# Patient Record
Sex: MALE | Race: BLACK OR AFRICAN AMERICAN | NOT HISPANIC OR LATINO
[De-identification: names, ages, dates, MRNs, and addresses within clinical notes are randomized per-mention and may not be internally consistent; named-entity substitution may affect disease eponyms.]

---

## 2017-05-18 ENCOUNTER — APPOINTMENT (OUTPATIENT)
Dept: ORTHOPEDIC SURGERY | Facility: CLINIC | Age: 72
End: 2017-05-18

## 2017-11-21 ENCOUNTER — APPOINTMENT (OUTPATIENT)
Dept: OTOLARYNGOLOGY | Facility: CLINIC | Age: 72
End: 2017-11-21
Payer: MEDICARE

## 2017-11-21 VITALS
BODY MASS INDEX: 32.47 KG/M2 | DIASTOLIC BLOOD PRESSURE: 71 MMHG | SYSTOLIC BLOOD PRESSURE: 146 MMHG | WEIGHT: 202 LBS | HEART RATE: 77 BPM | HEIGHT: 66 IN

## 2017-11-21 DIAGNOSIS — H90.3 SENSORINEURAL HEARING LOSS, BILATERAL: ICD-10-CM

## 2017-11-21 DIAGNOSIS — H69.83 OTHER SPECIFIED DISORDERS OF EUSTACHIAN TUBE, BILATERAL: ICD-10-CM

## 2017-11-21 DIAGNOSIS — Z83.3 FAMILY HISTORY OF DIABETES MELLITUS: ICD-10-CM

## 2017-11-21 DIAGNOSIS — J34.2 DEVIATED NASAL SEPTUM: ICD-10-CM

## 2017-11-21 DIAGNOSIS — H93.13 TINNITUS, BILATERAL: ICD-10-CM

## 2017-11-21 DIAGNOSIS — Z80.9 FAMILY HISTORY OF MALIGNANT NEOPLASM, UNSPECIFIED: ICD-10-CM

## 2017-11-21 DIAGNOSIS — J34.89 OTHER SPECIFIED DISORDERS OF NOSE AND NASAL SINUSES: ICD-10-CM

## 2017-11-21 DIAGNOSIS — J34.3 HYPERTROPHY OF NASAL TURBINATES: ICD-10-CM

## 2017-11-21 PROCEDURE — 92557 COMPREHENSIVE HEARING TEST: CPT

## 2017-11-21 PROCEDURE — 92550 TYMPANOMETRY & REFLEX THRESH: CPT

## 2017-11-21 PROCEDURE — 31231 NASAL ENDOSCOPY DX: CPT

## 2017-11-21 PROCEDURE — 99204 OFFICE O/P NEW MOD 45 MIN: CPT | Mod: 25

## 2018-05-22 ENCOUNTER — APPOINTMENT (OUTPATIENT)
Dept: OTOLARYNGOLOGY | Facility: CLINIC | Age: 73
End: 2018-05-22

## 2019-11-14 ENCOUNTER — INPATIENT (INPATIENT)
Facility: HOSPITAL | Age: 74
LOS: 3 days | Discharge: HOME | DRG: 872 | End: 2019-11-18
Attending: SPECIALIST | Admitting: SPECIALIST
Payer: MEDICARE

## 2019-11-14 VITALS
DIASTOLIC BLOOD PRESSURE: 60 MMHG | HEIGHT: 65.5 IN | SYSTOLIC BLOOD PRESSURE: 128 MMHG | WEIGHT: 212.08 LBS | RESPIRATION RATE: 18 BRPM | HEART RATE: 130 BPM | OXYGEN SATURATION: 97 % | TEMPERATURE: 101 F

## 2019-11-14 DIAGNOSIS — Z98.890 OTHER SPECIFIED POSTPROCEDURAL STATES: Chronic | ICD-10-CM

## 2019-11-14 DIAGNOSIS — A41.9 SEPSIS, UNSPECIFIED ORGANISM: ICD-10-CM

## 2019-11-14 DIAGNOSIS — N17.9 ACUTE KIDNEY FAILURE, UNSPECIFIED: ICD-10-CM

## 2019-11-14 DIAGNOSIS — E11.9 TYPE 2 DIABETES MELLITUS WITHOUT COMPLICATIONS: ICD-10-CM

## 2019-11-14 DIAGNOSIS — I10 ESSENTIAL (PRIMARY) HYPERTENSION: ICD-10-CM

## 2019-11-14 DIAGNOSIS — N39.0 URINARY TRACT INFECTION, SITE NOT SPECIFIED: ICD-10-CM

## 2019-11-14 DIAGNOSIS — Z29.9 ENCOUNTER FOR PROPHYLACTIC MEASURES, UNSPECIFIED: ICD-10-CM

## 2019-11-14 LAB
ALBUMIN SERPL ELPH-MCNC: 4.2 G/DL — SIGNIFICANT CHANGE UP (ref 3.3–5)
ALP SERPL-CCNC: 58 U/L — SIGNIFICANT CHANGE UP (ref 40–120)
ALT FLD-CCNC: 19 U/L — SIGNIFICANT CHANGE UP (ref 10–45)
ANION GAP SERPL CALC-SCNC: 13 MMOL/L — SIGNIFICANT CHANGE UP (ref 5–17)
ANISOCYTOSIS BLD QL: SLIGHT — SIGNIFICANT CHANGE UP
APPEARANCE UR: CLEAR — SIGNIFICANT CHANGE UP
APTT BLD: 34.3 SEC — SIGNIFICANT CHANGE UP (ref 27.5–36.3)
AST SERPL-CCNC: 17 U/L — SIGNIFICANT CHANGE UP (ref 10–40)
BACTERIA # UR AUTO: ABNORMAL
BASE EXCESS BLDV CALC-SCNC: -2.8 MMOL/L — LOW (ref -2–2)
BASOPHILS # BLD AUTO: 0 K/UL — SIGNIFICANT CHANGE UP (ref 0–0.2)
BASOPHILS NFR BLD AUTO: 0 % — SIGNIFICANT CHANGE UP (ref 0–2)
BILIRUB SERPL-MCNC: 1.3 MG/DL — HIGH (ref 0.2–1.2)
BILIRUB UR-MCNC: ABNORMAL
BUN SERPL-MCNC: 16 MG/DL — SIGNIFICANT CHANGE UP (ref 7–23)
CA-I SERPL-SCNC: 1.19 MMOL/L — SIGNIFICANT CHANGE UP (ref 1.12–1.3)
CALCIUM SERPL-MCNC: 9.4 MG/DL — SIGNIFICANT CHANGE UP (ref 8.4–10.5)
CHLORIDE BLDV-SCNC: 104 MMOL/L — SIGNIFICANT CHANGE UP (ref 96–108)
CHLORIDE SERPL-SCNC: 103 MMOL/L — SIGNIFICANT CHANGE UP (ref 96–108)
CO2 BLDV-SCNC: 22 MMOL/L — SIGNIFICANT CHANGE UP (ref 22–30)
CO2 SERPL-SCNC: 18 MMOL/L — LOW (ref 22–31)
COLOR SPEC: ABNORMAL
CREAT SERPL-MCNC: 1.7 MG/DL — HIGH (ref 0.5–1.3)
DIFF PNL FLD: NEGATIVE — SIGNIFICANT CHANGE UP
EOSINOPHIL # BLD AUTO: 0 K/UL — SIGNIFICANT CHANGE UP (ref 0–0.5)
EOSINOPHIL NFR BLD AUTO: 0 % — SIGNIFICANT CHANGE UP (ref 0–6)
EPI CELLS # UR: 1 /HPF — SIGNIFICANT CHANGE UP
GAS PNL BLDV: 136 MMOL/L — SIGNIFICANT CHANGE UP (ref 135–145)
GAS PNL BLDV: SIGNIFICANT CHANGE UP
GAS PNL BLDV: SIGNIFICANT CHANGE UP
GLUCOSE BLDV-MCNC: 324 MG/DL — HIGH (ref 70–99)
GLUCOSE SERPL-MCNC: 349 MG/DL — HIGH (ref 70–99)
GLUCOSE UR QL: NEGATIVE — SIGNIFICANT CHANGE UP
HCO3 BLDV-SCNC: 21 MMOL/L — SIGNIFICANT CHANGE UP (ref 21–29)
HCT VFR BLD CALC: 25.8 % — LOW (ref 39–50)
HCT VFR BLDA CALC: 30 % — LOW (ref 39–50)
HGB BLD CALC-MCNC: 9.6 G/DL — LOW (ref 13–17)
HGB BLD-MCNC: 9.2 G/DL — LOW (ref 13–17)
HYALINE CASTS # UR AUTO: 1 /LPF — SIGNIFICANT CHANGE UP (ref 0–2)
INR BLD: 1.29 RATIO — HIGH (ref 0.88–1.16)
KETONES UR-MCNC: NEGATIVE — SIGNIFICANT CHANGE UP
LACTATE BLDV-MCNC: 1.8 MMOL/L — SIGNIFICANT CHANGE UP (ref 0.7–2)
LEUKOCYTE ESTERASE UR-ACNC: ABNORMAL
LYMPHOCYTES # BLD AUTO: 0.39 K/UL — LOW (ref 1–3.3)
LYMPHOCYTES # BLD AUTO: 5 % — LOW (ref 13–44)
MANUAL SMEAR VERIFICATION: SIGNIFICANT CHANGE UP
MCHC RBC-ENTMCNC: 25.1 PG — LOW (ref 27–34)
MCHC RBC-ENTMCNC: 35.7 GM/DL — SIGNIFICANT CHANGE UP (ref 32–36)
MCV RBC AUTO: 70.3 FL — LOW (ref 80–100)
MICROCYTES BLD QL: SIGNIFICANT CHANGE UP
MONOCYTES # BLD AUTO: 0.47 K/UL — SIGNIFICANT CHANGE UP (ref 0–0.9)
MONOCYTES NFR BLD AUTO: 6 % — SIGNIFICANT CHANGE UP (ref 2–14)
NEUTROPHILS # BLD AUTO: 6.94 K/UL — SIGNIFICANT CHANGE UP (ref 1.8–7.4)
NEUTROPHILS NFR BLD AUTO: 89 % — HIGH (ref 43–77)
NITRITE UR-MCNC: POSITIVE
NRBC # BLD: 0 /100 — SIGNIFICANT CHANGE UP (ref 0–0)
PCO2 BLDV: 34 MMHG — LOW (ref 35–50)
PH BLDV: 7.41 — SIGNIFICANT CHANGE UP (ref 7.35–7.45)
PH UR: 6 — SIGNIFICANT CHANGE UP (ref 5–8)
PLAT MORPH BLD: NORMAL — SIGNIFICANT CHANGE UP
PLATELET # BLD AUTO: 142 K/UL — LOW (ref 150–400)
PO2 BLDV: 37 MMHG — SIGNIFICANT CHANGE UP (ref 25–45)
POTASSIUM BLDV-SCNC: 3.5 MMOL/L — SIGNIFICANT CHANGE UP (ref 3.5–5.3)
POTASSIUM SERPL-MCNC: 3.6 MMOL/L — SIGNIFICANT CHANGE UP (ref 3.5–5.3)
POTASSIUM SERPL-SCNC: 3.6 MMOL/L — SIGNIFICANT CHANGE UP (ref 3.5–5.3)
PROT SERPL-MCNC: 7.5 G/DL — SIGNIFICANT CHANGE UP (ref 6–8.3)
PROT UR-MCNC: ABNORMAL
PROTHROM AB SERPL-ACNC: 14.9 SEC — HIGH (ref 10–12.9)
RAPID RVP RESULT: SIGNIFICANT CHANGE UP
RBC # BLD: 3.67 M/UL — LOW (ref 4.2–5.8)
RBC # FLD: 18.6 % — HIGH (ref 10.3–14.5)
RBC BLD AUTO: ABNORMAL
RBC CASTS # UR COMP ASSIST: 2 /HPF — SIGNIFICANT CHANGE UP (ref 0–4)
SAO2 % BLDV: 68 % — SIGNIFICANT CHANGE UP (ref 67–88)
SODIUM SERPL-SCNC: 134 MMOL/L — LOW (ref 135–145)
SP GR SPEC: 1.01 — SIGNIFICANT CHANGE UP (ref 1.01–1.02)
UROBILINOGEN FLD QL: ABNORMAL
WBC # BLD: 7.8 K/UL — SIGNIFICANT CHANGE UP (ref 3.8–10.5)
WBC # FLD AUTO: 7.8 K/UL — SIGNIFICANT CHANGE UP (ref 3.8–10.5)
WBC UR QL: 14 /HPF — HIGH (ref 0–5)

## 2019-11-14 PROCEDURE — 71045 X-RAY EXAM CHEST 1 VIEW: CPT | Mod: 26

## 2019-11-14 PROCEDURE — 99223 1ST HOSP IP/OBS HIGH 75: CPT

## 2019-11-14 PROCEDURE — 99284 EMERGENCY DEPT VISIT MOD MDM: CPT

## 2019-11-14 RX ORDER — SODIUM CHLORIDE 9 MG/ML
1000 INJECTION INTRAMUSCULAR; INTRAVENOUS; SUBCUTANEOUS
Refills: 0 | Status: DISCONTINUED | OUTPATIENT
Start: 2019-11-14 | End: 2019-11-18

## 2019-11-14 RX ORDER — AMLODIPINE BESYLATE 2.5 MG/1
10 TABLET ORAL DAILY
Refills: 0 | Status: DISCONTINUED | OUTPATIENT
Start: 2019-11-14 | End: 2019-11-18

## 2019-11-14 RX ORDER — CEFEPIME 1 G/1
2000 INJECTION, POWDER, FOR SOLUTION INTRAMUSCULAR; INTRAVENOUS EVERY 12 HOURS
Refills: 0 | Status: DISCONTINUED | OUTPATIENT
Start: 2019-11-15 | End: 2019-11-18

## 2019-11-14 RX ORDER — DEXTROSE 50 % IN WATER 50 %
15 SYRINGE (ML) INTRAVENOUS ONCE
Refills: 0 | Status: DISCONTINUED | OUTPATIENT
Start: 2019-11-14 | End: 2019-11-18

## 2019-11-14 RX ORDER — PHENAZOPYRIDINE HCL 100 MG
200 TABLET ORAL ONCE
Refills: 0 | Status: COMPLETED | OUTPATIENT
Start: 2019-11-14 | End: 2019-11-14

## 2019-11-14 RX ORDER — CEFEPIME 1 G/1
2000 INJECTION, POWDER, FOR SOLUTION INTRAMUSCULAR; INTRAVENOUS ONCE
Refills: 0 | Status: COMPLETED | OUTPATIENT
Start: 2019-11-14 | End: 2019-11-15

## 2019-11-14 RX ORDER — DEXTROSE 50 % IN WATER 50 %
25 SYRINGE (ML) INTRAVENOUS ONCE
Refills: 0 | Status: DISCONTINUED | OUTPATIENT
Start: 2019-11-14 | End: 2019-11-18

## 2019-11-14 RX ORDER — CEFEPIME 1 G/1
INJECTION, POWDER, FOR SOLUTION INTRAMUSCULAR; INTRAVENOUS
Refills: 0 | Status: DISCONTINUED | OUTPATIENT
Start: 2019-11-15 | End: 2019-11-18

## 2019-11-14 RX ORDER — ACETAMINOPHEN 500 MG
650 TABLET ORAL ONCE
Refills: 0 | Status: COMPLETED | OUTPATIENT
Start: 2019-11-14 | End: 2019-11-14

## 2019-11-14 RX ORDER — CEFTRIAXONE 500 MG/1
1000 INJECTION, POWDER, FOR SOLUTION INTRAMUSCULAR; INTRAVENOUS ONCE
Refills: 0 | Status: COMPLETED | OUTPATIENT
Start: 2019-11-14 | End: 2019-11-14

## 2019-11-14 RX ORDER — SODIUM CHLORIDE 9 MG/ML
1000 INJECTION INTRAMUSCULAR; INTRAVENOUS; SUBCUTANEOUS ONCE
Refills: 0 | Status: COMPLETED | OUTPATIENT
Start: 2019-11-14 | End: 2019-11-14

## 2019-11-14 RX ORDER — GLUCAGON INJECTION, SOLUTION 0.5 MG/.1ML
1 INJECTION, SOLUTION SUBCUTANEOUS ONCE
Refills: 0 | Status: DISCONTINUED | OUTPATIENT
Start: 2019-11-14 | End: 2019-11-18

## 2019-11-14 RX ORDER — DEXTROSE 50 % IN WATER 50 %
12.5 SYRINGE (ML) INTRAVENOUS ONCE
Refills: 0 | Status: DISCONTINUED | OUTPATIENT
Start: 2019-11-14 | End: 2019-11-18

## 2019-11-14 RX ORDER — SODIUM CHLORIDE 9 MG/ML
1000 INJECTION, SOLUTION INTRAVENOUS
Refills: 0 | Status: DISCONTINUED | OUTPATIENT
Start: 2019-11-14 | End: 2019-11-18

## 2019-11-14 RX ORDER — HEPARIN SODIUM 5000 [USP'U]/ML
5000 INJECTION INTRAVENOUS; SUBCUTANEOUS EVERY 12 HOURS
Refills: 0 | Status: DISCONTINUED | OUTPATIENT
Start: 2019-11-14 | End: 2019-11-18

## 2019-11-14 RX ORDER — ASPIRIN/CALCIUM CARB/MAGNESIUM 324 MG
81 TABLET ORAL DAILY
Refills: 0 | Status: DISCONTINUED | OUTPATIENT
Start: 2019-11-14 | End: 2019-11-18

## 2019-11-14 RX ORDER — INSULIN LISPRO 100/ML
VIAL (ML) SUBCUTANEOUS AT BEDTIME
Refills: 0 | Status: DISCONTINUED | OUTPATIENT
Start: 2019-11-14 | End: 2019-11-18

## 2019-11-14 RX ORDER — INSULIN LISPRO 100/ML
VIAL (ML) SUBCUTANEOUS
Refills: 0 | Status: DISCONTINUED | OUTPATIENT
Start: 2019-11-14 | End: 2019-11-18

## 2019-11-14 RX ADMIN — Medication 650 MILLIGRAM(S): at 17:07

## 2019-11-14 RX ADMIN — Medication 650 MILLIGRAM(S): at 23:28

## 2019-11-14 RX ADMIN — Medication 650 MILLIGRAM(S): at 17:40

## 2019-11-14 RX ADMIN — Medication 650 MILLIGRAM(S): at 21:26

## 2019-11-14 RX ADMIN — CEFTRIAXONE 100 MILLIGRAM(S): 500 INJECTION, POWDER, FOR SOLUTION INTRAMUSCULAR; INTRAVENOUS at 20:54

## 2019-11-14 RX ADMIN — Medication 200 MILLIGRAM(S): at 17:07

## 2019-11-14 RX ADMIN — SODIUM CHLORIDE 1000 MILLILITER(S): 9 INJECTION INTRAMUSCULAR; INTRAVENOUS; SUBCUTANEOUS at 17:12

## 2019-11-14 NOTE — H&P ADULT - NSICDXPASTMEDICALHX_GEN_ALL_CORE_FT
PAST MEDICAL HISTORY:  Arthritis     BPH (benign prostatic hyperplasia)     Diabetes type 2    Hypertension     Sickle cell trait

## 2019-11-14 NOTE — ED PROVIDER NOTE - CLINICAL SUMMARY MEDICAL DECISION MAKING FREE TEXT BOX
We will call you within the next 2-3 days with the results of your urine culture  Drink plenty of fluids to help flush the infection out of your system  Monitor yourself for fevers, chills, back pain, if you develop any of this, go to the ER    Patient Education     Understanding Urinary Tract Infections (UTIs)  Most UTIs are caused by bacteria, although they may also be caused by viruses or fungi. Bacteria from the bowel are the most common source of infection. The infection may start because of any of the following:  · Sexual activity. During sex, bacteria can travel from the penis, vagina, or rectum into the urethra.   · Bacteria on the skin outside the rectum may travel into the urethra. This is more common in women since the rectum and urethra are closer to each other than in men. Wiping from front to back after using the toilet and keeping the area clean can help prevent germs from getting to the urethra.  · Blockage of urine flow through the urinary tract. If urine sits too long, germs may start to grow out of control.      Parts of the urinary tract  The infection can occur in any part of the urinary tract.  · The kidneys collect and store urine.  · The ureters carry urine from the kidneys to the bladder.  · The bladder holds urine until you are ready to let it out.  · The urethra carries urine from the bladder out of the body. It is shorter in women, so bacteria can move through it more easily. The urethra is longer in men, so a UTI is less likely to reach the bladder or kidneys in men.  Date Last Reviewed: 1/1/2017 © 2000-2018 [employer]. [address] [place] [address], [place] [address]. All rights reserved. This information is not intended as a substitute for professional medical care. Always follow your healthcare professional's instructions.            FCO Moncada MD: Pt is a 73 y/o male PMHX DM2, HTN, Sickle cell trait who p/w c/o fever (tmax 102.8) cough, nasal congestion, urinary discomfort x several days. Patient is 3 days s/p cystoscopy by his urologist for eval of painless hematuria. Next day, pt had urinary incontinence, followed by dysuria, urinary urgency and frequency. Pt also developed a cough with white sputum from last week, has 2 home sick contacts with URI sx. No flank pain. Some SP and penile discomfort. No gross hematuria. Ddx includes, however, is not limited to: UTI, PNA, viral syndrome, other. Pt arrived as code sepsis, rec'd empiric abx tx, IVF, basic labs, cx, CXR. Awaiting results, likely d/w PCP and admit

## 2019-11-14 NOTE — H&P ADULT - PROBLEM SELECTOR PLAN 5
hold quinapril in setting of FABIOLA and sepsis hold quinapril in setting of FABIOLA   c/w amlodipine for now

## 2019-11-14 NOTE — H&P ADULT - NSHPPHYSICALEXAM_GEN_ALL_CORE
PHYSICAL EXAM:  Vital Signs Last 24 Hrs  T(C): 37.8 (11-14-19 @ 20:55)  T(F): 100 (11-14-19 @ 20:55), Max: 102.6 (11-14-19 @ 15:29)  HR: 84 (11-14-19 @ 20:55) (84 - 130)  BP: 128/58 (11-14-19 @ 20:55)  BP(mean): --  RR: 18 (11-14-19 @ 20:55) (13 - 21)  SpO2: 97% (11-14-19 @ 20:55) (97% - 100%)  Wt(kg): --    Constitutional: NAD, awake and alert  EYES: EOMI  ENT:  Normal Hearing, no tonsillar exudates   Neck: Soft and supple, No JVD  Lungs: Breath sounds are clear bilaterally, No wheezing, rales or rhonchi  Heart: S1 and S2, regular rate and rhythm, no Murmurs, gallops or rubs  Abdomen: Bowel Sounds present, soft, nontender, nondistended, no guarding, no rebound  Extremities: No cyanosis or clubbing; warm to touch  Vascular: 2+ peripheral pulses lower ex  Neurological: A/O x 3, no focal deficits  Musculoskeletal: 5/5 strength b/l upper and lower extremities  Skin: No rashes  Psych: no depression or anhedonia  HEME: no bruises, no nose bleeds

## 2019-11-14 NOTE — H&P ADULT - PROBLEM SELECTOR PLAN 1
Patient with UTI, recent hx of instrumentation  will broaden Abx coverage to cover pseudomonas  start IV cefepime  f/u urine culture  f/u blood culture  c/w IVF

## 2019-11-14 NOTE — H&P ADULT - PROBLEM SELECTOR PLAN 3
Unknown baseline creatinine  ?prerenal vs. post renal   c/w IVF for now  monitor BMP  hold nephrotoxic agents

## 2019-11-14 NOTE — H&P ADULT - HISTORY OF PRESENT ILLNESS
73 yo male with PMH of T2DM, HTN, BPH, sickle cell train, presents here with fever and urinary frequency.  Patient states on November 6th, he had cystoscopy done.  He received abx x 2 doses.  After that, for first few days he had mild hematuria which cleared.  On Tuesday, patient started having increased urinary frequency.  He says Tuesday night he had to use the bathroom every hour and he was only dribbling and felt like he was not emptying his bladder.  He also had abdominal discomfort and penile discomfort.  Denies any hematuria.  Yesterday he called his urologist, but could not reach him.  Today his symptoms persisted and he also developed a fever of 102 at home.  Therefore he decided to come to ED.  He also has been feeling lethargic and flu like symptoms since Monday after his grandson who was also sick visited him.  Denies any cough, runny nose, chills, diarrhea. 73 yo male with PMH of T2DM, HTN, BPH, sickle cell train, presents here with fever and urinary frequency.  Patient states on November 6th, he had cystoscopy done.  He received abx x 2 doses.  After that, for first few days he had mild hematuria which cleared.  On Tuesday, patient started having increased urinary frequency.  He says Tuesday night he had to use the bathroom every hour and he was only dribbling and felt like he was not emptying his bladder.  He also had abdominal discomfort and penile discomfort.  Denies any hematuria.  Yesterday he called his urologist, but could not reach him.  Today his symptoms persisted and he also developed a fever of 102 at home.  Therefore he decided to come to ED.  He also has been feeling lethargic and flu like symptoms since Monday after his grandson who was also sick visited him.  Denies any cough, runny nose, chills, diarrhea.     On arrival to ED, patient's vitals show Temp 102.6, , /60, saturation 97% on RA.

## 2019-11-14 NOTE — H&P ADULT - NSHPREVIEWOFSYSTEMS_GEN_ALL_CORE
CONSTITUTIONAL: No weakness, fevers or chills  EYES/ENT: No visual changes;  No dysphagia  NECK: No pain or stiffness  RESPIRATORY: No cough, wheezing, hemoptysis; No shortness of breath  CARDIOVASCULAR: No chest pain or palpitations; No lower extremity edema  EXTREMITIES: no le edema, cyanosis, clubbing  MUSCULOSKELETAL: no joint pain, swelling  GASTROINTESTINAL: No abdominal or epigastric pain. No nausea, vomiting, or hematemesis; No diarrhea or constipation. No melena or hematochezia.  BACK: No back pain  GENITOURINARY: +urinary urgency, frequency;  NEUROLOGICAL: No numbness or weakness  SKIN: No itching, burning, rashes, or lesions   PSYCH: no agitation  All other review of systems is negative unless indicated above.

## 2019-11-14 NOTE — ED PROVIDER NOTE - PROGRESS NOTE DETAILS
FCO Moncada MD Reassessment Note: Repeat vital signs noted.  Patient evaluated after fluid administration. Lungs clear, heart RRR, skin exam with capillary refill < 2 seconds and lower extremities warm with no edema and dorsalis pedis pulses 2+ bilaterally.  Patient responding well to resuscitation.

## 2019-11-14 NOTE — ED PROVIDER NOTE - OBJECTIVE STATEMENT
74 year old male PMHX DM2, HTN, Sickle cell trait complaining of productive fever (tmax 102.8) cough, nasal congestion, urinary dribbling that has been progressively getting worst. Patient states he had cystoscopy by his urologist due to painless hematuria. Patient states concerned as he has urinary frequency but never completely empties his bladder. Admits to someone at home with the flu.  Denies dizziness, nausea, vomiting, shortness of breath, chest pain

## 2019-11-14 NOTE — ED ADULT NURSE REASSESSMENT NOTE - NS ED NURSE REASSESS COMMENT FT1
Pt remains stable in the ED. Resting comfortably in bed with family at the bedside. VSS. NAD. Will continue to monitor. Awaiting dispo.

## 2019-11-14 NOTE — H&P ADULT - NSHPLABSRESULTS_GEN_ALL_CORE
Labs personally reviewed:                          9.2    7.80  )-----------( 142      ( 2019 15:58 )             25.8     11-14    134<L>  |  103  |  16  ----------------------------<  349<H>  3.6   |  18<L>  |  1.70<H>    Ca    9.4      2019 15:58    TPro  7.5  /  Alb  4.2  /  TBili  1.3<H>  /  DBili  x   /  AST  17  /  ALT  19  /  AlkPhos  58  11-14        LIVER FUNCTIONS - ( 2019 15:58 )  Alb: 4.2 g/dL / Pro: 7.5 g/dL / ALK PHOS: 58 U/L / ALT: 19 U/L / AST: 17 U/L / GGT: x           PT/INR - ( 2019 15:58 )   PT: 14.9 sec;   INR: 1.29 ratio         PTT - ( 2019 15:58 )  PTT:34.3 sec  Urinalysis Basic - ( 2019 19:52 )    Color: Dark Orange / Appearance: Clear / S.015 / pH: x  Gluc: x / Ketone: Negative  / Bili: Small / Urobili: 2 mg/dL   Blood: x / Protein: 30 mg/dL / Nitrite: Positive   Leuk Esterase: Moderate / RBC: 2 /hpf / WBC 14 /HPF   Sq Epi: x / Non Sq Epi: 1 /hpf / Bacteria: Moderate      CAPILLARY BLOOD GLUCOSE          Imaging:  CXR personally reviewed: no focal opacity    EKG ordered

## 2019-11-14 NOTE — H&P ADULT - NSICDXFAMILYHX_GEN_ALL_CORE_FT
FAMILY HISTORY:  Sibling  Still living? Unknown  Family history of CVA, Age at diagnosis: Age Unknown

## 2019-11-14 NOTE — H&P ADULT - ATTENDING COMMENTS
Patient assigned to me by night hospitalist in charge for management and care for patient for this evening only. Care to be resumed by day hospitalist (Dr. Laboy) in the morning and thereafter.     Patient's care rendered on 11/14/19 at 10PM.      I was physically present for the key portions of the evaluation and management (E/M) service provided.  I agree with the above history, physical, and plan which I have reviewed and edited where appropriate.     Plan discussed with Patient, RN.

## 2019-11-14 NOTE — H&P ADULT - ASSESSMENT
75 yo male with PMH of T2DM, HTN, BPH, sickle cell train, presents here with fever and urinary frequency.

## 2019-11-15 LAB
ALBUMIN SERPL ELPH-MCNC: 3.4 G/DL — SIGNIFICANT CHANGE UP (ref 3.3–5)
ALP SERPL-CCNC: 48 U/L — SIGNIFICANT CHANGE UP (ref 40–120)
ALT FLD-CCNC: 20 U/L — SIGNIFICANT CHANGE UP (ref 10–45)
ANION GAP SERPL CALC-SCNC: 13 MMOL/L — SIGNIFICANT CHANGE UP (ref 5–17)
AST SERPL-CCNC: 30 U/L — SIGNIFICANT CHANGE UP (ref 10–40)
BASOPHILS # BLD AUTO: 0.01 K/UL — SIGNIFICANT CHANGE UP (ref 0–0.2)
BASOPHILS NFR BLD AUTO: 0.2 % — SIGNIFICANT CHANGE UP (ref 0–2)
BILIRUB SERPL-MCNC: 0.7 MG/DL — SIGNIFICANT CHANGE UP (ref 0.2–1.2)
BLD GP AB SCN SERPL QL: NEGATIVE — SIGNIFICANT CHANGE UP
BUN SERPL-MCNC: 16 MG/DL — SIGNIFICANT CHANGE UP (ref 7–23)
CALCIUM SERPL-MCNC: 8.6 MG/DL — SIGNIFICANT CHANGE UP (ref 8.4–10.5)
CHLORIDE SERPL-SCNC: 110 MMOL/L — HIGH (ref 96–108)
CO2 SERPL-SCNC: 19 MMOL/L — LOW (ref 22–31)
CREAT SERPL-MCNC: 1.46 MG/DL — HIGH (ref 0.5–1.3)
CULTURE RESULTS: NO GROWTH — SIGNIFICANT CHANGE UP
EOSINOPHIL # BLD AUTO: 0.03 K/UL — SIGNIFICANT CHANGE UP (ref 0–0.5)
EOSINOPHIL NFR BLD AUTO: 0.6 % — SIGNIFICANT CHANGE UP (ref 0–6)
GLUCOSE BLDC GLUCOMTR-MCNC: 176 MG/DL — HIGH (ref 70–99)
GLUCOSE BLDC GLUCOMTR-MCNC: 198 MG/DL — HIGH (ref 70–99)
GLUCOSE BLDC GLUCOMTR-MCNC: 204 MG/DL — HIGH (ref 70–99)
GLUCOSE BLDC GLUCOMTR-MCNC: 254 MG/DL — HIGH (ref 70–99)
GLUCOSE SERPL-MCNC: 175 MG/DL — HIGH (ref 70–99)
HBA1C BLD-MCNC: 4.8 % — SIGNIFICANT CHANGE UP (ref 4–5.6)
HCT VFR BLD CALC: 20 % — CRITICAL LOW (ref 39–50)
HCT VFR BLD CALC: 20.8 % — CRITICAL LOW (ref 39–50)
HCT VFR BLD CALC: 22.2 % — LOW (ref 39–50)
HCV AB S/CO SERPL IA: 0.08 S/CO — SIGNIFICANT CHANGE UP (ref 0–0.99)
HCV AB SERPL-IMP: SIGNIFICANT CHANGE UP
HGB BLD-MCNC: 7 G/DL — CRITICAL LOW (ref 13–17)
HGB BLD-MCNC: 7.6 G/DL — LOW (ref 13–17)
HGB BLD-MCNC: 8.1 G/DL — LOW (ref 13–17)
IGA FLD-MCNC: 271 MG/DL — SIGNIFICANT CHANGE UP (ref 84–499)
IGG FLD-MCNC: 716 MG/DL — SIGNIFICANT CHANGE UP (ref 610–1660)
IGM SERPL-MCNC: 41 MG/DL — SIGNIFICANT CHANGE UP (ref 35–242)
IMM GRANULOCYTES NFR BLD AUTO: 0.4 % — SIGNIFICANT CHANGE UP (ref 0–1.5)
INTERPRETATION SERPL IFE-IMP: SIGNIFICANT CHANGE UP
KAPPA LC SER QL IFE: 1.8 MG/DL — SIGNIFICANT CHANGE UP (ref 0.33–1.94)
KAPPA LC SER QL IFE: 1.8 MG/DL — SIGNIFICANT CHANGE UP (ref 0.33–1.94)
KAPPA/LAMBDA FREE LIGHT CHAIN RATIO, SERUM: 0.71 RATIO — SIGNIFICANT CHANGE UP (ref 0.26–1.65)
KAPPA/LAMBDA FREE LIGHT CHAIN RATIO, SERUM: 0.71 RATIO — SIGNIFICANT CHANGE UP (ref 0.26–1.65)
LAMBDA LC SER QL IFE: 2.53 MG/DL — SIGNIFICANT CHANGE UP (ref 0.57–2.63)
LAMBDA LC SER QL IFE: 2.53 MG/DL — SIGNIFICANT CHANGE UP (ref 0.57–2.63)
LYMPHOCYTES # BLD AUTO: 0.94 K/UL — LOW (ref 1–3.3)
LYMPHOCYTES # BLD AUTO: 18.8 % — SIGNIFICANT CHANGE UP (ref 13–44)
MAGNESIUM SERPL-MCNC: 2 MG/DL — SIGNIFICANT CHANGE UP (ref 1.6–2.6)
MCHC RBC-ENTMCNC: 24.9 PG — LOW (ref 27–34)
MCHC RBC-ENTMCNC: 25.9 PG — LOW (ref 27–34)
MCHC RBC-ENTMCNC: 25.9 PG — LOW (ref 27–34)
MCHC RBC-ENTMCNC: 35 GM/DL — SIGNIFICANT CHANGE UP (ref 32–36)
MCHC RBC-ENTMCNC: 36.5 GM/DL — HIGH (ref 32–36)
MCHC RBC-ENTMCNC: 36.5 GM/DL — HIGH (ref 32–36)
MCV RBC AUTO: 70.9 FL — LOW (ref 80–100)
MCV RBC AUTO: 71 FL — LOW (ref 80–100)
MCV RBC AUTO: 71.2 FL — LOW (ref 80–100)
MONOCYTES # BLD AUTO: 0.33 K/UL — SIGNIFICANT CHANGE UP (ref 0–0.9)
MONOCYTES NFR BLD AUTO: 6.6 % — SIGNIFICANT CHANGE UP (ref 2–14)
NEUTROPHILS # BLD AUTO: 3.68 K/UL — SIGNIFICANT CHANGE UP (ref 1.8–7.4)
NEUTROPHILS NFR BLD AUTO: 73.4 % — SIGNIFICANT CHANGE UP (ref 43–77)
NRBC # BLD: 0 /100 WBCS — SIGNIFICANT CHANGE UP (ref 0–0)
NRBC # BLD: 0 /100 WBCS — SIGNIFICANT CHANGE UP (ref 0–0)
PHOSPHATE SERPL-MCNC: 2.1 MG/DL — LOW (ref 2.5–4.5)
PLATELET # BLD AUTO: 100 K/UL — LOW (ref 150–400)
PLATELET # BLD AUTO: 107 K/UL — LOW (ref 150–400)
PLATELET # BLD AUTO: 114 K/UL — LOW (ref 150–400)
POTASSIUM SERPL-MCNC: 4.2 MMOL/L — SIGNIFICANT CHANGE UP (ref 3.5–5.3)
POTASSIUM SERPL-SCNC: 4.2 MMOL/L — SIGNIFICANT CHANGE UP (ref 3.5–5.3)
PROT SERPL-MCNC: 6.4 G/DL — SIGNIFICANT CHANGE UP (ref 6–8.3)
RBC # BLD: 2.81 M/UL — LOW (ref 4.2–5.8)
RBC # BLD: 2.93 M/UL — LOW (ref 4.2–5.8)
RBC # BLD: 3.13 M/UL — LOW (ref 4.2–5.8)
RBC # FLD: 18.7 % — HIGH (ref 10.3–14.5)
RBC # FLD: 19 % — HIGH (ref 10.3–14.5)
RBC # FLD: 19.7 % — HIGH (ref 10.3–14.5)
RH IG SCN BLD-IMP: POSITIVE — SIGNIFICANT CHANGE UP
SODIUM SERPL-SCNC: 142 MMOL/L — SIGNIFICANT CHANGE UP (ref 135–145)
SPECIMEN SOURCE: SIGNIFICANT CHANGE UP
WBC # BLD: 4.86 K/UL — SIGNIFICANT CHANGE UP (ref 3.8–10.5)
WBC # BLD: 5.01 K/UL — SIGNIFICANT CHANGE UP (ref 3.8–10.5)
WBC # BLD: 5.33 K/UL — SIGNIFICANT CHANGE UP (ref 3.8–10.5)
WBC # FLD AUTO: 4.86 K/UL — SIGNIFICANT CHANGE UP (ref 3.8–10.5)
WBC # FLD AUTO: 5.01 K/UL — SIGNIFICANT CHANGE UP (ref 3.8–10.5)
WBC # FLD AUTO: 5.33 K/UL — SIGNIFICANT CHANGE UP (ref 3.8–10.5)

## 2019-11-15 PROCEDURE — 99222 1ST HOSP IP/OBS MODERATE 55: CPT

## 2019-11-15 RX ORDER — ACETAMINOPHEN 500 MG
650 TABLET ORAL ONCE
Refills: 0 | Status: COMPLETED | OUTPATIENT
Start: 2019-11-15 | End: 2019-11-15

## 2019-11-15 RX ORDER — CHLORHEXIDINE GLUCONATE 213 G/1000ML
1 SOLUTION TOPICAL DAILY
Refills: 0 | Status: DISCONTINUED | OUTPATIENT
Start: 2019-11-15 | End: 2019-11-18

## 2019-11-15 RX ORDER — INFLUENZA VIRUS VACCINE 15; 15; 15; 15 UG/.5ML; UG/.5ML; UG/.5ML; UG/.5ML
0.5 SUSPENSION INTRAMUSCULAR ONCE
Refills: 0 | Status: COMPLETED | OUTPATIENT
Start: 2019-11-15 | End: 2019-11-15

## 2019-11-15 RX ADMIN — CEFEPIME 100 MILLIGRAM(S): 1 INJECTION, POWDER, FOR SOLUTION INTRAMUSCULAR; INTRAVENOUS at 00:55

## 2019-11-15 RX ADMIN — AMLODIPINE BESYLATE 10 MILLIGRAM(S): 2.5 TABLET ORAL at 05:13

## 2019-11-15 RX ADMIN — HEPARIN SODIUM 5000 UNIT(S): 5000 INJECTION INTRAVENOUS; SUBCUTANEOUS at 18:28

## 2019-11-15 RX ADMIN — Medication 81 MILLIGRAM(S): at 13:13

## 2019-11-15 RX ADMIN — CEFEPIME 100 MILLIGRAM(S): 1 INJECTION, POWDER, FOR SOLUTION INTRAMUSCULAR; INTRAVENOUS at 05:13

## 2019-11-15 RX ADMIN — HEPARIN SODIUM 5000 UNIT(S): 5000 INJECTION INTRAVENOUS; SUBCUTANEOUS at 05:13

## 2019-11-15 RX ADMIN — Medication 650 MILLIGRAM(S): at 04:10

## 2019-11-15 RX ADMIN — SODIUM CHLORIDE 100 MILLILITER(S): 9 INJECTION INTRAMUSCULAR; INTRAVENOUS; SUBCUTANEOUS at 05:14

## 2019-11-15 RX ADMIN — CEFEPIME 100 MILLIGRAM(S): 1 INJECTION, POWDER, FOR SOLUTION INTRAMUSCULAR; INTRAVENOUS at 18:29

## 2019-11-15 RX ADMIN — Medication 3: at 18:28

## 2019-11-15 RX ADMIN — CHLORHEXIDINE GLUCONATE 1 APPLICATION(S): 213 SOLUTION TOPICAL at 13:14

## 2019-11-15 RX ADMIN — Medication 1: at 14:28

## 2019-11-15 NOTE — CONSULT NOTE ADULT - SUBJECTIVE AND OBJECTIVE BOX
Jessica Fajardo - [phone]    Patient is a 74y old  Male who presents with a chief complaint of increased urinary frequency (2019 22:31)    HPI:  74M with T2DM, HTN, BPH, sickle cell trait, presents here with fever and urinary frequency.  Recent cystoscopy for microscopic hematuria on 19.  Juana-procedure antibiotics.  By 19 - he developed urinary frequency adn incontinence.  Feeling of incomplete bladder emptying and nocturia.  , fever at home and came to the ER.  Here febrile to 102.6  No leukocytosis.  Ceftriaxone given then changed to cefepime.  Cultures sent.  ID called to help management.  No cough/SOB. No sick contacts.  No n/v/d.  Feels much better now.    prior hospital charts reviewed [  ]  primary team notes reviewed [ x ]  other consultant notes reviewed [ x ]    PAST MEDICAL & SURGICAL HISTORY:  Arthritis  BPH (benign prostatic hyperplasia)  Sickle cell trait  Hypertension  Diabetes: type 2  H/O cystoscopy  S/P hernia repair    Allergies  No Known Allergies    ANTIMICROBIALS:  cefTRIAXone   IVPB x1 11/15  cefepime   IVPB 2000 every 12 hours (11/15-)    MEDICATIONS  (STANDING):  amLODIPine   Tablet 10 daily  aspirin enteric coated 81 daily  heparin  Injectable 5000 every 12 hours  influenza   Vaccine 0.5 once  insulin lispro (HumaLOG) corrective regimen sliding scale  three times a day before meals  insulin lispro (HumaLOG) corrective regimen sliding scale  at bedtime    SOCIAL HISTORY:  non-smoker    FAMILY HISTORY:  Family history of CVA (Sibling)  father prostate cancer    REVIEW OF SYSTEMS  [ x ] ROS unobtainable because:    [ x ] All other systems negative except as noted below:	    Constitutional:  [ ] fever [ ] chills  [ ] weight loss  [ ] weakness  Skin:  [ ] rash [ ] phlebitis	  Eyes: [ ] icterus [ ] pain  [ ] discharge	  ENMT: [ ] sore throat  [ ] thrush [ ] ulcers [ ] exudates  Respiratory: [ ] dyspnea [ ] hemoptysis [ ] cough [ ] sputum	  Cardiovascular:  [ ] chest pain [ ] palpitations [ ] edema	  Gastrointestinal:  [ ] nausea [ ] vomiting [ ] diarrhea [ ] constipation [ ] pain	  Genitourinary:  [ ] dysuria [ ] frequency [ ] hematuria [ ] discharge [ ] flank pain  [ ] incontinence  Musculoskeletal:  [ ] myalgias [ ] arthralgias [ ] arthritis  [ ] back pain  Neurological:  [ ] headache [ ] seizures  [ ] confusion/altered mental status  Psychiatric:  [ ] anxiety [ ] depression	  Hematology/Lymphatics:  [ ] lymphadenopathy  Endocrine:  [ ] adrenal [ ] thyroid  Allergic/Immunologic:	 [ ] transplant [ ] seasonal    Vital Signs Last 24 Hrs  T(F): 98.2 (11-15-19 @ 07:42), Max: 102.6 (19 @ 15:29)  Vital Signs Last 24 Hrs  HR: 78 (11-15-19 @ 07:42) (78 - 130)  BP: 142/70 (11-15-19 @ 07:42) (101/59 - 180/64)  RR: 18 (11-15-19 @ 07:42)  SpO2: 97% (11-15-19 @ 07:42) (96% - 100%)  Wt(kg): --    PHYSICAL EXAM:  General: non-toxic  HEAD/EYES: anicteric, PERRL  ENT:  supple  Cardiovascular:   S1, S2  Respiratory:  clear bilaterally  GI:  soft, non-tender, normal bowel sounds  :  no CVA tenderness   Musculoskeletal:  no synovitis  Neurologic:  grossly non-focal  Skin:  no rash  Lymph: no lymphadenopathy  Psychiatric:  appropriate affect  Vascular:  no phlebitis    WBC Count: 5.33 (11-15-19 @ 12:12)  WBC Count: 5.01 (11-15-19 @ 09:48)  WBC Count: 7.80 (19 @ 15:58)                        7.6    5.33  )-----------( 114      ( 15 Nov 2019 12:12 )             20.8     142  |  110<H>  |  16  ----------------------------<  175<H>  4.2   |  19<L>  |  1.46<H>    Ca    8.6      15 Nov 2019 07:16  Phos  2.1     -15  Mg     2.0     11-15    TPro  6.4  /  Alb  3.4  /  TBili  0.7  /  DBili  x   /  AST  30  /  ALT  20  /  AlkPhos  48  -15    Urinalysis Basic - ( 2019 19:52 )    Color: Dark Orange / Appearance: Clear / S.015 / pH: x  Gluc: x / Ketone: Negative  / Bili: Small / Urobili: 2 mg/dL   Blood: x / Protein: 30 mg/dL / Nitrite: Positive   Leuk Esterase: Moderate / RBC: 2 /hpf / WBC 14 /HPF   Sq Epi: x / Non Sq Epi: 1 /hpf / Bacteria: Moderate    MICROBIOLOGY:  BC, UC pending    Rapid RVP Result: Tonya ( @ 17:29)    RADIOLOGY:  imaging below personally reviewed    Xray Chest 1 View-PORTABLE IMMEDIATE (19 @ 16:13) >  IMPRESSION:  Clear lungs

## 2019-11-15 NOTE — PROGRESS NOTE ADULT - ASSESSMENT
73 yo male with PMH of T2DM, HTN, BPH, sickle cell train, presents here with fever and urinary frequency. s/p cystoscopy on 11-6-19 for microscopic hematuria. kirti procedure antibiotics.     11-15-19: ID consult appreciated. Anemia w/u. + Hx splenomegaly. US Abd. 73 yo male with PMH of T2DM, HTN, BPH, sickle cell train, presents here with fever and urinary frequency. s/p cystoscopy on 11-6-19 for microscopic hematuria. kirti procedure antibiotics.     11-15-19: ID consult appreciated. Anemia w/u. + Hx splenomegaly. US Abd. Will transfuse 1 u PRBCs today

## 2019-11-15 NOTE — PROGRESS NOTE ADULT - SUBJECTIVE AND OBJECTIVE BOX
Patient is a 74y old  Male who presents with a chief complaint of increased urinary frequency (15 Nov 2019 12:53)       Pt is seen and examined  pt is awake and lying in bed  pt seems comfortable    MEDICATIONS  (STANDING):  amLODIPine   Tablet 10 milliGRAM(s) Oral daily  aspirin enteric coated 81 milliGRAM(s) Oral daily  cefepime   IVPB 2000 milliGRAM(s) IV Intermittent every 12 hours  cefepime   IVPB      chlorhexidine 4% Liquid 1 Application(s) Topical daily  dextrose 5%. 1000 milliLiter(s) (50 mL/Hr) IV Continuous <Continuous>  dextrose 50% Injectable 12.5 Gram(s) IV Push once  dextrose 50% Injectable 25 Gram(s) IV Push once  dextrose 50% Injectable 25 Gram(s) IV Push once  heparin  Injectable 5000 Unit(s) SubCutaneous every 12 hours  influenza   Vaccine 0.5 milliLiter(s) IntraMuscular once  insulin lispro (HumaLOG) corrective regimen sliding scale   SubCutaneous three times a day before meals  insulin lispro (HumaLOG) corrective regimen sliding scale   SubCutaneous at bedtime  sodium chloride 0.9%. 1000 milliLiter(s) (100 mL/Hr) IV Continuous <Continuous>    MEDICATIONS  (PRN):  dextrose 40% Gel 15 Gram(s) Oral once PRN Blood Glucose LESS THAN 70 milliGRAM(s)/deciliter  glucagon  Injectable 1 milliGRAM(s) IntraMuscular once PRN Glucose LESS THAN 70 milligrams/deciliter      Allergies    No Known Allergies    Intolerances        Vital Signs Last 24 Hrs  T(C): 36.8 (15 Nov 2019 07:42), Max: 39.2 (14 Nov 2019 15:29)  T(F): 98.2 (15 Nov 2019 07:42), Max: 102.6 (14 Nov 2019 15:29)  HR: 78 (15 Nov 2019 07:42) (78 - 130)  BP: 142/70 (15 Nov 2019 07:42) (101/59 - 180/64)  BP(mean): --  RR: 18 (15 Nov 2019 07:42) (13 - 21)  SpO2: 97% (15 Nov 2019 07:42) (96% - 100%)        LABS:                          7.6    5.33  )-----------( 114      ( 15 Nov 2019 12:12 )             20.8         Mean Cell Volume : 71.0 fl  Mean Cell Hemoglobin : 25.9 pg  Mean Cell Hemoglobin Concentration : 36.5 gm/dL  Auto Neutrophil # : x  Auto Lymphocyte # : x  Auto Monocyte # : x  Auto Eosinophil # : x  Auto Basophil # : x  Auto Neutrophil % : x  Auto Lymphocyte % : x  Auto Monocyte % : x  Auto Eosinophil % : x  Auto Basophil % : x    Serial CBC's  11-15 @ 12:12  Hct-20.8 / Hgb-7.6 / Plat-114 / RBC-2.93 / WBC-5.33          Serial CBC's  11-15 @ 09:48  Hct-20.0 / Hgb-7.0 / Plat-100 / RBC-2.81 / WBC-5.01          Serial CBC's  11-14 @ 15:58  Hct-25.8 / Hgb-9.2 / Plat-142 / RBC-3.67 / WBC-7.80            11-15    142  |  110<H>  |  16  ----------------------------<  175<H>  4.2   |  19<L>  |  1.46<H>    Ca    8.6      15 Nov 2019 07:16  Phos  2.1     11-15  Mg     2.0     11-15    TPro  6.4  /  Alb  3.4  /  TBili  0.7  /  DBili  x   /  AST  30  /  ALT  20  /  AlkPhos  48  11-15      PT/INR - ( 14 Nov 2019 15:58 )   PT: 14.9 sec;   INR: 1.29 ratio         PTT - ( 14 Nov 2019 15:58 )  PTT:34.3 sec    WBC Count: 5.33 K/uL (11-15-19 @ 12:12)  Hemoglobin: 7.6 g/dL (11-15-19 @ 12:12)  Hematocrit: 20.8 % (11-15-19 @ 12:12)  Platelet Count - Automated: 114 K/uL (11-15-19 @ 12:12)  WBC Count: 5.01 K/uL (11-15-19 @ 09:48)  Hemoglobin: 7.0 g/dL (11-15-19 @ 09:48)  Hematocrit: 20.0 % (11-15-19 @ 09:48)  Platelet Count - Automated: 100 K/uL (11-15-19 @ 09:48)  WBC Count: 7.80 K/uL (11-14-19 @ 15:58)  Hemoglobin: 9.2 g/dL (11-14-19 @ 15:58)  Hematocrit: 25.8 % (11-14-19 @ 15:58)  Platelet Count - Automated: 142 K/uL (11-14-19 @ 15:58)                      RADIOLOGY & ADDITIONAL STUDIES:

## 2019-11-15 NOTE — CONSULT NOTE ADULT - ASSESSMENT
74M DM, HTN, BPH +FH prostate cancer s/p cystoscopy for microscopic hematuria now with fever.  Suspect UTI    Suggest:  - f/u all cultures  - cefepime okay for now  - hope to transition to oral antibiotics soon pending BC/UC results    above d/w NP on 8M    Please call the ID service [phone] with questions or concerns over the weekend

## 2019-11-15 NOTE — CHART NOTE - NSCHARTNOTEFT_GEN_A_CORE
Pt with down trend h/h 7/20 with repeat 7.6/ 20   consented for 1 unit PRBC   Anemia w/u in progress   Myeloma w/u in progress  occult stool ordered   VSS   Above plan discussed with Dr Laboy   Department of Medicine   JANNA SPANN-c [id number]

## 2019-11-16 LAB
ANION GAP SERPL CALC-SCNC: 7 MMOL/L — SIGNIFICANT CHANGE UP (ref 5–17)
BUN SERPL-MCNC: 10 MG/DL — SIGNIFICANT CHANGE UP (ref 7–23)
CALCIUM SERPL-MCNC: 9.3 MG/DL — SIGNIFICANT CHANGE UP (ref 8.4–10.5)
CHLORIDE SERPL-SCNC: 110 MMOL/L — HIGH (ref 96–108)
CO2 SERPL-SCNC: 21 MMOL/L — LOW (ref 22–31)
CREAT SERPL-MCNC: 1.27 MG/DL — SIGNIFICANT CHANGE UP (ref 0.5–1.3)
FERRITIN SERPL-MCNC: 331 NG/ML — SIGNIFICANT CHANGE UP (ref 30–400)
GLUCOSE BLDC GLUCOMTR-MCNC: 215 MG/DL — HIGH (ref 70–99)
GLUCOSE BLDC GLUCOMTR-MCNC: 220 MG/DL — HIGH (ref 70–99)
GLUCOSE BLDC GLUCOMTR-MCNC: 232 MG/DL — HIGH (ref 70–99)
GLUCOSE BLDC GLUCOMTR-MCNC: 235 MG/DL — HIGH (ref 70–99)
GLUCOSE SERPL-MCNC: 202 MG/DL — HIGH (ref 70–99)
HAPTOGLOB SERPL-MCNC: 159 MG/DL — SIGNIFICANT CHANGE UP (ref 34–200)
HCT VFR BLD CALC: 21.3 % — LOW (ref 39–50)
HGB BLD-MCNC: 7.7 G/DL — LOW (ref 13–17)
IRON SATN MFR SERPL: 18 % — SIGNIFICANT CHANGE UP (ref 16–55)
IRON SATN MFR SERPL: 36 UG/DL — LOW (ref 45–165)
MCHC RBC-ENTMCNC: 26.4 PG — LOW (ref 27–34)
MCHC RBC-ENTMCNC: 36.2 GM/DL — HIGH (ref 32–36)
MCV RBC AUTO: 72.9 FL — LOW (ref 80–100)
OB PNL STL: NEGATIVE — SIGNIFICANT CHANGE UP
PLATELET # BLD AUTO: 121 K/UL — LOW (ref 150–400)
POTASSIUM SERPL-MCNC: 3.8 MMOL/L — SIGNIFICANT CHANGE UP (ref 3.5–5.3)
POTASSIUM SERPL-SCNC: 3.8 MMOL/L — SIGNIFICANT CHANGE UP (ref 3.5–5.3)
RBC # BLD: 2.92 M/UL — LOW (ref 4.2–5.8)
RBC # BLD: 2.92 M/UL — LOW (ref 4.2–5.8)
RBC # FLD: 19.8 % — HIGH (ref 10.3–14.5)
RETICS #: 10.2 K/UL — LOW (ref 25–125)
RETICS/RBC NFR: 0.4 % — LOW (ref 0.5–2.5)
SODIUM SERPL-SCNC: 138 MMOL/L — SIGNIFICANT CHANGE UP (ref 135–145)
TIBC SERPL-MCNC: 205 UG/DL — LOW (ref 220–430)
UIBC SERPL-MCNC: 169 UG/DL — SIGNIFICANT CHANGE UP (ref 110–370)
VIT B12 SERPL-MCNC: 740 PG/ML — SIGNIFICANT CHANGE UP (ref 232–1245)
WBC # BLD: 3.88 K/UL — SIGNIFICANT CHANGE UP (ref 3.8–10.5)
WBC # FLD AUTO: 3.88 K/UL — SIGNIFICANT CHANGE UP (ref 3.8–10.5)

## 2019-11-16 PROCEDURE — 99233 SBSQ HOSP IP/OBS HIGH 50: CPT | Mod: GC

## 2019-11-16 RX ORDER — POLYETHYLENE GLYCOL 3350 17 G/17G
17 POWDER, FOR SOLUTION ORAL DAILY
Refills: 0 | Status: DISCONTINUED | OUTPATIENT
Start: 2019-11-16 | End: 2019-11-18

## 2019-11-16 RX ORDER — SENNA PLUS 8.6 MG/1
2 TABLET ORAL AT BEDTIME
Refills: 0 | Status: DISCONTINUED | OUTPATIENT
Start: 2019-11-16 | End: 2019-11-18

## 2019-11-16 RX ORDER — BENZOCAINE AND MENTHOL 5; 1 G/100ML; G/100ML
1 LIQUID ORAL ONCE
Refills: 0 | Status: COMPLETED | OUTPATIENT
Start: 2019-11-16 | End: 2019-11-16

## 2019-11-16 RX ADMIN — HEPARIN SODIUM 5000 UNIT(S): 5000 INJECTION INTRAVENOUS; SUBCUTANEOUS at 06:31

## 2019-11-16 RX ADMIN — POLYETHYLENE GLYCOL 3350 17 GRAM(S): 17 POWDER, FOR SOLUTION ORAL at 21:16

## 2019-11-16 RX ADMIN — Medication 2: at 08:36

## 2019-11-16 RX ADMIN — SODIUM CHLORIDE 100 MILLILITER(S): 9 INJECTION INTRAMUSCULAR; INTRAVENOUS; SUBCUTANEOUS at 21:16

## 2019-11-16 RX ADMIN — HEPARIN SODIUM 5000 UNIT(S): 5000 INJECTION INTRAVENOUS; SUBCUTANEOUS at 18:30

## 2019-11-16 RX ADMIN — CHLORHEXIDINE GLUCONATE 1 APPLICATION(S): 213 SOLUTION TOPICAL at 12:53

## 2019-11-16 RX ADMIN — BENZOCAINE AND MENTHOL 1 LOZENGE: 5; 1 LIQUID ORAL at 04:01

## 2019-11-16 RX ADMIN — Medication 81 MILLIGRAM(S): at 12:53

## 2019-11-16 RX ADMIN — AMLODIPINE BESYLATE 10 MILLIGRAM(S): 2.5 TABLET ORAL at 06:31

## 2019-11-16 RX ADMIN — CEFEPIME 100 MILLIGRAM(S): 1 INJECTION, POWDER, FOR SOLUTION INTRAMUSCULAR; INTRAVENOUS at 18:28

## 2019-11-16 RX ADMIN — CEFEPIME 100 MILLIGRAM(S): 1 INJECTION, POWDER, FOR SOLUTION INTRAMUSCULAR; INTRAVENOUS at 06:30

## 2019-11-16 RX ADMIN — Medication 2: at 14:26

## 2019-11-16 RX ADMIN — SENNA PLUS 2 TABLET(S): 8.6 TABLET ORAL at 21:16

## 2019-11-16 RX ADMIN — Medication 2: at 18:37

## 2019-11-16 RX ADMIN — SODIUM CHLORIDE 100 MILLILITER(S): 9 INJECTION INTRAMUSCULAR; INTRAVENOUS; SUBCUTANEOUS at 08:37

## 2019-11-16 NOTE — PROGRESS NOTE ADULT - ASSESSMENT
74M DM, HTN, BPH +FH prostate cancer s/p cystoscopy for microscopic hematuria now with fever.   UA positive and patient had urinary symptoms along with recent instrumentation  Leukopenia along with anemia and thrombocytopenia  Ucx and Bcx NGTD  Patient received antibiotics post procedure for 2 doses (unclear which one)  Suspect UTI but need to rule out hematoma given drop in hb    Suggest:  - f/u all cultures  - continue cefepime for now  - Obtain CT A/P with IV contrast to rule out infection/obstruction/hematoma    Discussed with NP    Greg Agarwal DO  Pager [phone]   ID fellow, PGY 5  After 5pm/weekends call [phone] 74M DM, HTN, BPH +FH prostate cancer s/p cystoscopy for microscopic hematuria now with fever.   UA positive and patient had urinary symptoms along with recent instrumentation  Leukopenia along with anemia and thrombocytopenia  Ucx and Bcx NGTD  Patient received antibiotics post procedure for 2 doses (unclear which one)  Suspect UTI but need to rule out hematoma given drop in hb    Overall fever, now resolved, UTI, anemia, pt's baseline around 8-9, drop in hb requiring transfusion and now trending down again,   retic count low, likely not sickle cell as pt has trait. All cultures negative to date.       Suggest:  - f/u all cultures  - continue cefepime for now  - Obtain CT A/P with IV contrast to rule out infection/obstruction/hematoma      Discussed with NP    Greg Agarwal DO  Pager [phone]   ID fellow, PGY 5  After 5pm/weekends call [phone]

## 2019-11-16 NOTE — PROGRESS NOTE ADULT - ASSESSMENT
73 yo male with PMH of T2DM, HTN, BPH, sickle cell train, presents here with fever and urinary frequency. s/p cystoscopy on 11-6-19 for microscopic hematuria. kirti procedure antibiotics.     11-15-19: ID consult appreciated. Anemia w/u. + Hx splenomegaly. US Abd. Will transfuse 1 u PRBCs today  11-16-19: Will get nephrology consult before IV contrast given. Dr Melissa Romano will see him tomorrow. Dr Mark Murray will see him for GI consult. Senna / colace for BM. Ambulate.

## 2019-11-16 NOTE — PROGRESS NOTE ADULT - SUBJECTIVE AND OBJECTIVE BOX
Patient is a 74y old  Male who presents with a chief complaint of increased urinary frequency (2019 11:50)       Pt is seen and examined  pt is awake and lying in bed  pt seems comfortable     MEDICATIONS  (STANDING):  amLODIPine   Tablet 10 milliGRAM(s) Oral daily  aspirin enteric coated 81 milliGRAM(s) Oral daily  cefepime   IVPB 2000 milliGRAM(s) IV Intermittent every 12 hours  cefepime   IVPB      chlorhexidine 4% Liquid 1 Application(s) Topical daily  dextrose 5%. 1000 milliLiter(s) (50 mL/Hr) IV Continuous <Continuous>  dextrose 50% Injectable 12.5 Gram(s) IV Push once  dextrose 50% Injectable 25 Gram(s) IV Push once  dextrose 50% Injectable 25 Gram(s) IV Push once  heparin  Injectable 5000 Unit(s) SubCutaneous every 12 hours  influenza   Vaccine 0.5 milliLiter(s) IntraMuscular once  insulin lispro (HumaLOG) corrective regimen sliding scale   SubCutaneous three times a day before meals  insulin lispro (HumaLOG) corrective regimen sliding scale   SubCutaneous at bedtime  sodium chloride 0.9%. 1000 milliLiter(s) (100 mL/Hr) IV Continuous <Continuous>    MEDICATIONS  (PRN):  dextrose 40% Gel 15 Gram(s) Oral once PRN Blood Glucose LESS THAN 70 milliGRAM(s)/deciliter  glucagon  Injectable 1 milliGRAM(s) IntraMuscular once PRN Glucose LESS THAN 70 milligrams/deciliter      Allergies    No Known Allergies    Intolerances        Vital Signs Last 24 Hrs  T(C): 36.8 (2019 17:30), Max: 37.4 (15 Nov 2019 20:09)  T(F): 98.2 (2019 17:30), Max: 99.3 (15 Nov 2019 20:09)  HR: 75 (2019 17:30) (75 - 84)  BP: 151/68 (2019 17:30) (123/65 - 151/68)  BP(mean): --  RR: 18 (2019 17:30) (18 - 18)  SpO2: 97% (2019 17:30) (95% - 99%)        LABS:                          7.7    3.88  )-----------( 121      ( 2019 10:31 )             21.3         Mean Cell Volume : 72.9 fl  Mean Cell Hemoglobin : 26.4 pg  Mean Cell Hemoglobin Concentration : 36.2 gm/dL  Auto Neutrophil # : x  Auto Lymphocyte # : x  Auto Monocyte # : x  Auto Eosinophil # : x  Auto Basophil # : x  Auto Neutrophil % : x  Auto Lymphocyte % : x  Auto Monocyte % : x  Auto Eosinophil % : x  Auto Basophil % : x    Serial CBC's   @ 10:31  Hct-21.3 / Hgb-7.7 / Plat-121 / RBC-2.92 / WBC-3.88          Serial CBC's  11-15 @ 21:29  Hct-22.2 / Hgb-8.1 / Plat-107 / RBC-3.13 / WBC-4.86          Serial CBC's  11-15 @ 12:12  Hct-20.8 / Hgb-7.6 / Plat-114 / RBC-2.93 / WBC-5.33          Serial CBC's  11-15 @ 09:48  Hct-20.0 / Hgb-7.0 / Plat-100 / RBC-2.81 / WBC-5.01          Serial CBC's   @ 15:58  Hct-25.8 / Hgb-9.2 / Plat-142 / RBC-3.67 / WBC-7.80                138  |  110<H>  |  10  ----------------------------<  202<H>  3.8   |  21<L>  |  1.27    Ca    9.3      2019 07:34  Phos  2.1     11-15  Mg     2.0     11-15    TPro  6.4  /  Alb  3.4  /  TBili  0.7  /  DBili  x   /  AST  30  /  ALT  20  /  AlkPhos  48  11-15          WBC Count: 3.88 K/uL (19 @ 10:31)  Hemoglobin: 7.7 g/dL (19 @ 10:31)  Hematocrit: 21.3 % (19 @ 10:31)  Platelet Count - Automated: 121 K/uL (19 @ 10:31)  Reticulocyte Percent: 0.4 % (19 @ 10:31)  Iron - Total Binding Capacity.: 205 ug/dL (19 @ 10:04)  Vitamin B12, Serum: 740 pg/mL (19 @ 10:04)  Ferritin, Serum: 331 ng/mL (19 @ 10:04)  WBC Count: 4.86 K/uL (11-15-19 @ 21:29)  Hemoglobin: 8.1 g/dL (11-15-19 @ 21:29)  Hematocrit: 22.2 % (11-15-19 @ 21:29)  Platelet Count - Automated: 107 K/uL (11-15-19 @ 21:29)  WBC Count: 5.33 K/uL (11-15-19 @ 12:12)  Hemoglobin: 7.6 g/dL (11-15-19 @ 12:12)  Hematocrit: 20.8 % (11-15-19 @ 12:12)  Platelet Count - Automated: 114 K/uL (11-15-19 @ 12:12)  WBC Count: 5.01 K/uL (11-15-19 @ 09:48)  Hemoglobin: 7.0 g/dL (11-15-19 @ 09:48)  Hematocrit: 20.0 % (11-15-19 @ 09:48)  Platelet Count - Automated: 100 K/uL (11-15-19 @ 09:48)  WBC Count: 7.80 K/uL (19 @ 15:58)  Hemoglobin: 9.2 g/dL (19 @ 15:58)  Hematocrit: 25.8 % (19 @ 15:58)  Platelet Count - Automated: 142 K/uL (19 @ 15:58)      Quantitative Ig mg/dL (11-15 @ 20:38)  Quantitative IgA: 271 mg/dL (11-15 @ 20:38)  Quantitative IgM: 41 mg/dL (11-15 @ 20:38)  HEIDY Kappa: 1.80 mg/dL (11-15 @ 20:38)  HEIDY Lambda: 2.53 mg/dL (11-15 @ 20:38)  Immunofixation, Serum:   No Monoclonal Band Identified    Reference Range: None Detected (11-15 @ 20:38)  HEIDY Kappa: 1.80 mg/dL (11-15 @ 20:38)  HEIDY Lambda: 2.53 mg/dL (11-15 @ 20:38)                  RADIOLOGY & ADDITIONAL STUDIES:

## 2019-11-16 NOTE — CHART NOTE - NSCHARTNOTEFT_GEN_A_CORE
Ordered 1rbc for hgb 7.7 as per Dr Laboy. ID recommended   CT abdomen with contrast to r/o bleeding/infection.  Dr Laboy wants to wait until cleared by renal.  Patient admitted with Roel, now Cr 1.27  Phylicia north NP  [phone]

## 2019-11-16 NOTE — PROGRESS NOTE ADULT - SUBJECTIVE AND OBJECTIVE BOX
Patient is a 74y old  Male who presents with a chief complaint of increased urinary frequency (15 Nov 2019 14:46)      Follow Up: ID following for fever/UTI    Interval History: patient overall feeling better.   Started developing cough overnight which is resolved.   Drop in Hb     ROS:    All other systems negative    Constitutional: no fever, no chills  Head: no trauma  Eyes: no vision changes, no eye pain  ENT:  no sore throat, no rhinorrhea  Cardiovascular:  no chest pain, no palpitation  Respiratory:  no SOB, +cough  GI:  no abd pain, no vomiting, no diarrhea  urinary: no dysuria, no hematuria, no flank pain  musculoskeletal:  no joint pain, no joint swelling  skin:  no rash  neurology:  no headache, no seizure, no change in mental status  psych: no anxiety, no depression         Allergies  No Known Allergies        ANTIMICROBIALS:  cefepime   IVPB 2000 every 12 hours  cefepime   IVPB        OTHER MEDS:  amLODIPine   Tablet 10 milliGRAM(s) Oral daily  aspirin enteric coated 81 milliGRAM(s) Oral daily  chlorhexidine 4% Liquid 1 Application(s) Topical daily  dextrose 40% Gel 15 Gram(s) Oral once PRN  dextrose 5%. 1000 milliLiter(s) IV Continuous <Continuous>  dextrose 50% Injectable 12.5 Gram(s) IV Push once  dextrose 50% Injectable 25 Gram(s) IV Push once  dextrose 50% Injectable 25 Gram(s) IV Push once  glucagon  Injectable 1 milliGRAM(s) IntraMuscular once PRN  heparin  Injectable 5000 Unit(s) SubCutaneous every 12 hours  influenza   Vaccine 0.5 milliLiter(s) IntraMuscular once  insulin lispro (HumaLOG) corrective regimen sliding scale   SubCutaneous three times a day before meals  insulin lispro (HumaLOG) corrective regimen sliding scale   SubCutaneous at bedtime  sodium chloride 0.9%. 1000 milliLiter(s) IV Continuous <Continuous>      Vital Signs Last 24 Hrs  T(C): 37.2 (2019 08:34), Max: 37.4 (15 Nov 2019 20:09)  T(F): 99 (2019 08:34), Max: 99.3 (15 Nov 2019 20:09)  HR: 84 (2019 08:34) (80 - 92)  BP: 129/64 (2019 08:34) (111/64 - 149/72)  BP(mean): --  RR: 18 (2019 08:34) (18 - 18)  SpO2: 97% (2019 08:34) (95% - 97%)    Physical Exam:  General:    NAD,  non toxic, A&O x 3  Head: atraumatic, normocephalic  Eye: normal sclera and conjunctiva  ENT:    no oropharyngeal lesions,   no LAD,   neck supple  Cardio:     regular S1, S2,  no murmur  Respiratory:    clear b/l,    no wheezing  abd:     soft,   BS +,   no tenderness,    no organomegaly  :   no CVAT,  no suprapubic tenderness,   no  logan  Musculoskeletal:   no joint swelling,   no edema  vascular: no lines, normal pulses, 2 PIV c/d/i  Skin:    no rash  Neurologic:     no focal deficit  psych: normal affect    WBC Count: 3.88 K/uL ( @ 10:31)  WBC Count: 4.86 K/uL (11-15 @ 21:29)  WBC Count: 5.33 K/uL (11-15 @ 12:12)  WBC Count: 5.01 K/uL (11-15 @ 09:48)  WBC Count: 7.80 K/uL ( @ 15:58)    Hemoglobin: 7.7 g/dL ( @ 10:31)  Hemoglobin: 8.1 g/dL (11-15 @ 21:29)  Hemoglobin: 7.6 g/dL (11-15 @ 12:12)  Hemoglobin: 7.0 g/dL (11-15 @ 09:48)  Hemoglobin: 9.2 g/dL ( @ 15:58)                          7.7    3.88  )-----------( 121      ( 2019 10:31 )             21.3           138  |  110<H>  |  10  ----------------------------<  202<H>  3.8   |  21<L>  |  1.27    Ca    9.3      2019 07:34  Phos  2.1     11-15  Mg     2.0     11-15    TPro  6.4  /  Alb  3.4  /  TBili  0.7  /  DBili  x   /  AST  30  /  ALT  20  /  AlkPhos  48  11-15      Urinalysis Basic - ( 2019 19:52 )    Color: Dark Orange / Appearance: Clear / S.015 / pH: x  Gluc: x / Ketone: Negative  / Bili: Small / Urobili: 2 mg/dL   Blood: x / Protein: 30 mg/dL / Nitrite: Positive   Leuk Esterase: Moderate / RBC: 2 /hpf / WBC 14 /HPF   Sq Epi: x / Non Sq Epi: 1 /hpf / Bacteria: Moderate        Creatinine Trend: 1.27<--, 1.46<--, 1.70<--    Blood Gas Venous - Lactate: 1.8 mmoL/L (19 @ 15:58)      MICROBIOLOGY:  v  .Urine Clean Catch (Midstream)  19   No growth  --  --      .Blood Blood-Peripheral  19   No growth to date.  --  --    Rapid RVP Result: NotDetec ( @ 17:29)        RADIOLOGY: Patient is a 74y old  Male who presents with a chief complaint of increased urinary frequency (15 Nov 2019 14:46)      Follow Up: ID following for fever/UTI    Interval History: patient overall feeling better.   Started developing cough overnight which is resolved.   Drop in Hb       ROS:    All other systems negative    Constitutional: no fever, no chills  Cardiovascular:  no chest pain,  Respiratory:  no SOB, +cough  GI:  no abd pain, no vomiting, no diarrhea  urinary: resolved hesitancy, no frequency   skin:  no rash        Allergies  No Known Allergies        ANTIMICROBIALS:  cefepime   IVPB 2000 every 12 hours  cefepime   IVPB        OTHER MEDS:  amLODIPine   Tablet 10 milliGRAM(s) Oral daily  aspirin enteric coated 81 milliGRAM(s) Oral daily  chlorhexidine 4% Liquid 1 Application(s) Topical daily  dextrose 40% Gel 15 Gram(s) Oral once PRN  dextrose 5%. 1000 milliLiter(s) IV Continuous <Continuous>  dextrose 50% Injectable 12.5 Gram(s) IV Push once  dextrose 50% Injectable 25 Gram(s) IV Push once  dextrose 50% Injectable 25 Gram(s) IV Push once  glucagon  Injectable 1 milliGRAM(s) IntraMuscular once PRN  heparin  Injectable 5000 Unit(s) SubCutaneous every 12 hours  influenza   Vaccine 0.5 milliLiter(s) IntraMuscular once  insulin lispro (HumaLOG) corrective regimen sliding scale   SubCutaneous three times a day before meals  insulin lispro (HumaLOG) corrective regimen sliding scale   SubCutaneous at bedtime  sodium chloride 0.9%. 1000 milliLiter(s) IV Continuous <Continuous>      Vital Signs Last 24 Hrs  T(C): 37.2 (2019 08:34), Max: 37.4 (15 Nov 2019 20:09)  T(F): 99 (2019 08:34), Max: 99.3 (15 Nov 2019 20:09)  HR: 84 (2019 08:34) (80 - 92)  BP: 129/64 (2019 08:34) (111/64 - 149/72)  BP(mean): --  RR: 18 (2019 08:34) (18 - 18)  SpO2: 97% (2019 08:34) (95% - 97%)    Physical Exam:  General:    NAD,  non toxic, A&O x 3  Cardio:     regular S1, S2,   Respiratory:    clear b/l,   abd:     soft,   BS +,   no tenderness,   :  no suprapubic tenderness,   no  logan  vascular: no edema   Skin:    no rash      WBC Count: 3.88 K/uL ( @ 10:31)  WBC Count: 4.86 K/uL (11-15 @ 21:29)  WBC Count: 5.33 K/uL (11-15 @ 12:12)  WBC Count: 5.01 K/uL (11-15 @ 09:48)  WBC Count: 7.80 K/uL ( 15:58)    Hemoglobin: 7.7 g/dL ( @ 10:31)  Hemoglobin: 8.1 g/dL (11-15 @ 21:29)  Hemoglobin: 7.6 g/dL (11-15 @ 12:12)  Hemoglobin: 7.0 g/dL (11-15 @ 09:48)  Hemoglobin: 9.2 g/dL ( @ 15:58)                          7.7    3.88  )-----------( 121      ( 2019 10:31 )             21.3           138  |  110<H>  |  10  ----------------------------<  202<H>  3.8   |  21<L>  |  1.27    Ca    9.3      2019 07:34  Phos  2.1     11-15  Mg     2.0     11-15    TPro  6.4  /  Alb  3.4  /  TBili  0.7  /  DBili  x   /  AST  30  /  ALT  20  /  AlkPhos  48  11-15      Urinalysis Basic - ( 2019 19:52 )    Color: Dark Orange / Appearance: Clear / S.015 / pH: x  Gluc: x / Ketone: Negative  / Bili: Small / Urobili: 2 mg/dL   Blood: x / Protein: 30 mg/dL / Nitrite: Positive   Leuk Esterase: Moderate / RBC: 2 /hpf / WBC 14 /HPF   Sq Epi: x / Non Sq Epi: 1 /hpf / Bacteria: Moderate        Creatinine Trend: 1.27<--, 1.46<--, 1.70<--    Blood Gas Venous - Lactate: 1.8 mmoL/L (19 @ 15:58)      MICROBIOLOGY:  v  .Urine Clean Catch (Midstream)  19   No growth  --  --      .Blood Blood-Peripheral  19   No growth to date.  --  --    Rapid RVP Result: Tonya ( @ 17:29)

## 2019-11-17 DIAGNOSIS — D63.8 ANEMIA IN OTHER CHRONIC DISEASES CLASSIFIED ELSEWHERE: ICD-10-CM

## 2019-11-17 LAB
ANION GAP SERPL CALC-SCNC: 13 MMOL/L — SIGNIFICANT CHANGE UP (ref 5–17)
BUN SERPL-MCNC: 10 MG/DL — SIGNIFICANT CHANGE UP (ref 7–23)
CALCIUM SERPL-MCNC: 8.9 MG/DL — SIGNIFICANT CHANGE UP (ref 8.4–10.5)
CHLORIDE SERPL-SCNC: 107 MMOL/L — SIGNIFICANT CHANGE UP (ref 96–108)
CO2 SERPL-SCNC: 20 MMOL/L — LOW (ref 22–31)
CREAT SERPL-MCNC: 1.15 MG/DL — SIGNIFICANT CHANGE UP (ref 0.5–1.3)
GLUCOSE BLDC GLUCOMTR-MCNC: 207 MG/DL — HIGH (ref 70–99)
GLUCOSE BLDC GLUCOMTR-MCNC: 220 MG/DL — HIGH (ref 70–99)
GLUCOSE BLDC GLUCOMTR-MCNC: 284 MG/DL — HIGH (ref 70–99)
GLUCOSE BLDC GLUCOMTR-MCNC: 288 MG/DL — HIGH (ref 70–99)
GLUCOSE SERPL-MCNC: 217 MG/DL — HIGH (ref 70–99)
HCT VFR BLD CALC: 23.8 % — LOW (ref 39–50)
HGB BLD-MCNC: 8.5 G/DL — LOW (ref 13–17)
MCHC RBC-ENTMCNC: 26.5 PG — LOW (ref 27–34)
MCHC RBC-ENTMCNC: 35.7 GM/DL — SIGNIFICANT CHANGE UP (ref 32–36)
MCV RBC AUTO: 74.1 FL — LOW (ref 80–100)
PLATELET # BLD AUTO: 126 K/UL — LOW (ref 150–400)
POTASSIUM SERPL-MCNC: 3.5 MMOL/L — SIGNIFICANT CHANGE UP (ref 3.5–5.3)
POTASSIUM SERPL-SCNC: 3.5 MMOL/L — SIGNIFICANT CHANGE UP (ref 3.5–5.3)
RBC # BLD: 3.21 M/UL — LOW (ref 4.2–5.8)
RBC # FLD: 20.4 % — HIGH (ref 10.3–14.5)
SODIUM SERPL-SCNC: 140 MMOL/L — SIGNIFICANT CHANGE UP (ref 135–145)
WBC # BLD: 3.4 K/UL — LOW (ref 3.8–10.5)
WBC # FLD AUTO: 3.4 K/UL — LOW (ref 3.8–10.5)

## 2019-11-17 PROCEDURE — 74177 CT ABD & PELVIS W/CONTRAST: CPT | Mod: 26

## 2019-11-17 RX ORDER — ACETYLCYSTEINE 200 MG/ML
1200 VIAL (ML) MISCELLANEOUS
Refills: 0 | Status: DISCONTINUED | OUTPATIENT
Start: 2019-11-17 | End: 2019-11-18

## 2019-11-17 RX ADMIN — AMLODIPINE BESYLATE 10 MILLIGRAM(S): 2.5 TABLET ORAL at 05:45

## 2019-11-17 RX ADMIN — Medication 2: at 18:19

## 2019-11-17 RX ADMIN — CEFEPIME 100 MILLIGRAM(S): 1 INJECTION, POWDER, FOR SOLUTION INTRAMUSCULAR; INTRAVENOUS at 05:45

## 2019-11-17 RX ADMIN — Medication 2: at 09:30

## 2019-11-17 RX ADMIN — HEPARIN SODIUM 5000 UNIT(S): 5000 INJECTION INTRAVENOUS; SUBCUTANEOUS at 05:45

## 2019-11-17 RX ADMIN — POLYETHYLENE GLYCOL 3350 17 GRAM(S): 17 POWDER, FOR SOLUTION ORAL at 13:39

## 2019-11-17 RX ADMIN — Medication 81 MILLIGRAM(S): at 13:38

## 2019-11-17 RX ADMIN — SODIUM CHLORIDE 100 MILLILITER(S): 9 INJECTION INTRAMUSCULAR; INTRAVENOUS; SUBCUTANEOUS at 09:46

## 2019-11-17 RX ADMIN — CHLORHEXIDINE GLUCONATE 1 APPLICATION(S): 213 SOLUTION TOPICAL at 13:38

## 2019-11-17 RX ADMIN — Medication 3: at 13:36

## 2019-11-17 RX ADMIN — Medication 1200 MILLIGRAM(S): at 18:06

## 2019-11-17 RX ADMIN — HEPARIN SODIUM 5000 UNIT(S): 5000 INJECTION INTRAVENOUS; SUBCUTANEOUS at 18:10

## 2019-11-17 RX ADMIN — CEFEPIME 100 MILLIGRAM(S): 1 INJECTION, POWDER, FOR SOLUTION INTRAMUSCULAR; INTRAVENOUS at 18:07

## 2019-11-17 NOTE — CONSULT NOTE ADULT - SUBJECTIVE AND OBJECTIVE BOX
[facility] NEPHROLOGY- CONSULTATION NOTE    Patient is a 73yo Male with DM type 2, HTN, BPH, sickle cell trait a/w Sepsis 2/2 UTI and FABIOLA. Renal consulted for Elevated serum creatinine.  Pt had a cystoscopy performed on  and developed urinary frequency/ UTI s/p abx. However pt had persistent urinary frequency and developed fever with chills and body ache the day of admission. Pt found to have elevated SCr 1.7 on admission which improved to SCr 1.15 today with IVF. ID requesting CT abd/pelvis with IV contrast to r/o infection/ obstruction/ hematoma.   Pt denies any previous h/o kidney disease. Denies any NSAID use or recent CT with IV contrast. Pt states his dysuria resolved. Denies any SOB, chest pain, n/v/d or hematuria. Pt c/o decreased PO intake.       PAST MEDICAL & SURGICAL HISTORY:  Arthritis  BPH (benign prostatic hyperplasia)  Sickle cell trait  Hypertension  Diabetes: type 2  H/O cystoscopy  S/P hernia repair    No Known Allergies    Home Medications Reviewed  Hospital Medications:   MEDICATIONS  (STANDING):  acetylcysteine  Oral Solution 1200 milliGRAM(s) Oral <User Schedule>  amLODIPine   Tablet 10 milliGRAM(s) Oral daily  aspirin enteric coated 81 milliGRAM(s) Oral daily  cefepime   IVPB 2000 milliGRAM(s) IV Intermittent every 12 hours  cefepime   IVPB      chlorhexidine 4% Liquid 1 Application(s) Topical daily  dextrose 5%. 1000 milliLiter(s) (50 mL/Hr) IV Continuous <Continuous>  dextrose 50% Injectable 12.5 Gram(s) IV Push once  dextrose 50% Injectable 25 Gram(s) IV Push once  dextrose 50% Injectable 25 Gram(s) IV Push once  heparin  Injectable 5000 Unit(s) SubCutaneous every 12 hours  influenza   Vaccine 0.5 milliLiter(s) IntraMuscular once  insulin lispro (HumaLOG) corrective regimen sliding scale   SubCutaneous three times a day before meals  insulin lispro (HumaLOG) corrective regimen sliding scale   SubCutaneous at bedtime  polyethylene glycol 3350 17 Gram(s) Oral daily  senna 2 Tablet(s) Oral at bedtime  sodium chloride 0.9%. 1000 milliLiter(s) (100 mL/Hr) IV Continuous <Continuous>    SOCIAL HISTORY:    FAMILY HISTORY:  Family history of CVA (Sibling)      REVIEW OF SYSTEMS:  Gen: no changes in weight  HEENT: no rhinorrhea  Neck: no sore throat  Cards: no chest pain  Resp: no dyspnea  GI: no nausea or vomiting or diarrhea  : +dysuria- resolved No hematuria  Vascular: no LE edema  Derm: no rashes  Neuro: no numbness/tingling  All other review of systems is negative unless indicated above.    VITALS:  T(F): 98.3 (19 @ 08:39), Max: 99 (19 @ 00:05)  HR: 75 (19 @ 08:39)  BP: 141/81 (19 @ 08:39)  RR: 18 (19 @ 08:39)  SpO2: 95% (19 @ 08:39)  Wt(kg): --     @ 07:01  -   @ 07:00  --------------------------------------------------------  IN: 2300 mL / OUT: 1900 mL / NET: 400 mL     @ 07:01  -   @ 12:53  --------------------------------------------------------  IN: 350 mL / OUT: 600 mL / NET: -250 mL        PHYSICAL EXAM:  Gen: NAD, calm  HEENT: MMM  Neck: no JVD  Cards: RRR, +S1/S2, no M/G/R  Resp: CTA B/L  GI: soft, NT/ND, NABS  : no CVA tenderness  Extremities: no LE edema B/L  Derm: no rashes  Neuro: non-focal    LABS:      140  |  107  |  10  ----------------------------<  217<H>  3.5   |  20<L>  |  1.15    Ca    8.9      2019 07:11      Creatinine Trend: 1.15 <--, 1.27 <--, 1.46 <--, 1.70 <--                        8.5    3.40  )-----------( 126      ( 2019 09:36 )             23.8     Urine Studies:  Urinalysis Basic - ( 2019 19:52 )    Color: Dark Orange / Appearance: Clear / S.015 / pH:   Gluc:  / Ketone: Negative  / Bili: Small / Urobili: 2 mg/dL   Blood:  / Protein: 30 mg/dL / Nitrite: Positive   Leuk Esterase: Moderate / RBC: 2 /hpf / WBC 14 /HPF   Sq Epi:  / Non Sq Epi: 1 /hpf / Bacteria: Moderate        RADIOLOGY & ADDITIONAL STUDIES:

## 2019-11-17 NOTE — CONSULT NOTE ADULT - SUBJECTIVE AND OBJECTIVE BOX
Chief Complaint:  Patient is a 74y old  Male who presents with a chief complaint of increased urinary frequency (16 Nov 2019 18:25)      HPI: Asked to evaluate this 74M with T2DM, HTN, BPH, sickle cell trait, presented here with fever and urinary frequency.  Recent cystoscopy for microscopic hematuria on 11/6/19.  Juana-procedure antibiotics.  During his hospitalization patuient was noted to have low H/H. Patient denies any rectal blding no abd pain no wght loss.        Allergies:  No Known Allergies      Medications:  amLODIPine   Tablet 10 milliGRAM(s) Oral daily  aspirin enteric coated 81 milliGRAM(s) Oral daily  cefepime   IVPB 2000 milliGRAM(s) IV Intermittent every 12 hours  cefepime   IVPB      chlorhexidine 4% Liquid 1 Application(s) Topical daily  dextrose 40% Gel 15 Gram(s) Oral once PRN  dextrose 5%. 1000 milliLiter(s) IV Continuous <Continuous>  dextrose 50% Injectable 12.5 Gram(s) IV Push once  dextrose 50% Injectable 25 Gram(s) IV Push once  dextrose 50% Injectable 25 Gram(s) IV Push once  glucagon  Injectable 1 milliGRAM(s) IntraMuscular once PRN  heparin  Injectable 5000 Unit(s) SubCutaneous every 12 hours  influenza   Vaccine 0.5 milliLiter(s) IntraMuscular once  insulin lispro (HumaLOG) corrective regimen sliding scale   SubCutaneous three times a day before meals  insulin lispro (HumaLOG) corrective regimen sliding scale   SubCutaneous at bedtime  polyethylene glycol 3350 17 Gram(s) Oral daily  senna 2 Tablet(s) Oral at bedtime  sodium chloride 0.9%. 1000 milliLiter(s) IV Continuous <Continuous>      PMHX/PSHX:  Arthritis  BPH (benign prostatic hyperplasia)  Sickle cell trait  Hypertension  Diabetes  No pertinent past medical history  H/O cystoscopy  S/P hernia repair      Family history:  Family history of CVA (Sibling)      Social History:     ROS:     General:  No wt loss, fevers, chills, night sweats, fatigue,   Eyes:  Good vision, no reported pain  ENT:  No sore throat, pain, runny nose, dysphagia  CV:  No pain, palpitations, hypo/hypertension  Resp:  No dyspnea, cough, tachypnea, wheezing  GI:  No pain, No nausea, No vomiting, No diarrhea, No constipation, No weight loss, No fever, No pruritis, No rectal bleeding, No tarry stools, No dysphagia,  :  No pain, bleeding, incontinence, nocturia  Muscle:  No pain, weakness  Neuro:  No weakness, tingling, memory problems  Psych:  No fatigue, insomnia, mood problems, depression  Endocrine:  No polyuria, polydipsia, cold/heat intolerance  Heme:  No petechiae, ecchymosis, easy bruisability  Skin:  No rash, tattoos, scars, edema      PHYSICAL EXAM:   Vital Signs:  Vital Signs Last 24 Hrs  T(C): 36.8 (17 Nov 2019 08:39), Max: 37.2 (17 Nov 2019 00:05)  T(F): 98.3 (17 Nov 2019 08:39), Max: 99 (17 Nov 2019 00:05)  HR: 75 (17 Nov 2019 08:39) (70 - 76)  BP: 141/81 (17 Nov 2019 08:39) (133/68 - 151/68)  BP(mean): --  RR: 18 (17 Nov 2019 08:39) (18 - 18)  SpO2: 95% (17 Nov 2019 08:39) (95% - 99%)  Daily     Daily     GENERAL:  well-nourished, no distress  HEENT:  NC/AT,  conjunctivae clear and pink,   CHEST:  breath sounds clear  HEART:  Regular rhythm, S1, S2,   ABDOMEN:  Soft, non-tender, BS present,   EXTEREMITIES:  no edema  SKIN:  No rash/erythema/ecchymoses/petechiae/wounds/abscess/warm/dry  NEURO:  Alert, oriented, no asterixis, no tremor, no encephalopathy    LABS:                        8.5    3.40  )-----------( 126      ( 17 Nov 2019 09:36 )             23.8     11-17    140  |  107  |  10  ----------------------------<  217<H>  3.5   |  20<L>  |  1.15    Ca    8.9      17 Nov 2019 07:11                Imaging:

## 2019-11-17 NOTE — CONSULT NOTE ADULT - ATTENDING COMMENTS
[facility] NEPHROLOGY  Pavel White M.D.  Sung Gaines D.O.  Melissa Romano M.D.  Giovanna Carpio, MSN, ANP-C  [phone]    [address] [place] [address]

## 2019-11-17 NOTE — CONSULT NOTE ADULT - ASSESSMENT
Patient is a 75yo Male with DM type 2, HTN, BPH, sickle cell trait a/w Sepsis 2/2 UTI and FABIOLA. Renal consulted for Elevated serum creatinine.    1. FABIOLA- likely hemodynamically mediated in the setting of sepsis/ anemia/ decreased PO intake with ACEi use. Continue to hold ACEi. Renal function improving with IVF. Ok from renal standpoint to pursue CT with IV contrast. c/w IVF 12 hr pre/post CT scan and will start Mucomyst 1200mg PO x4 doses tonight to minimize risk of contrast induced nephropathy. Pt for CT abd/pelvis with IV contrast in am. Plan discussed with NP.   Strict I/Os.  Monitor BMP.    2. CKD-2- previous SCr 1.0-1.2 in 2015. Defer secondary w/u at this time. Monitor lytes    3. HTN 2/2 CKD- BP acceptable. Continue to hold ACEi. c/w Amlodipine 10 mg PO daily. Monitor BP    4. Sepsis 2/2 UTI- Pt on Cefepime. UCx and BCx NG.  ID following.

## 2019-11-17 NOTE — CONSULT NOTE ADULT - PROBLEM SELECTOR RECOMMENDATION 9
no signs of active GI bleed  patient had colonoscopy 4 years ago told normal  spoke with Dr Laboy agree with CT scan abd and pelvis w contrast  monitor CBC  GI w/u as outpatient  Discuss w NP

## 2019-11-17 NOTE — PROGRESS NOTE ADULT - ASSESSMENT
75 yo male with PMH of T2DM, HTN, BPH, sickle cell train, presents here with fever and urinary frequency. s/p cystoscopy on 11-6-19 for microscopic hematuria. kirti procedure antibiotics.     11-15-19: ID consult appreciated. Anemia w/u. + Hx splenomegaly. US Abd. Will transfuse 1 u PRBCs today  11-16-19: Will get nephrology consult before IV contrast given. Dr Melissa Romano will see him tomorrow. Dr Mark Murray will see him for GI consult. Senna / colace for BM. Ambulate.  11-17-19:  Both GI and nephrology consults appreciated. Good BMx2 today S/P CTs

## 2019-11-17 NOTE — PROGRESS NOTE ADULT - SUBJECTIVE AND OBJECTIVE BOX
Patient is a 74y old  Male who presents with a chief complaint of increased urinary frequency (2019 12:53)       Pt is seen and examined  pt is awake and lying in bed  pt seems comfortable     MEDICATIONS  (STANDING):  acetylcysteine  Oral Solution 1200 milliGRAM(s) Oral <User Schedule>  amLODIPine   Tablet 10 milliGRAM(s) Oral daily  aspirin enteric coated 81 milliGRAM(s) Oral daily  cefepime   IVPB 2000 milliGRAM(s) IV Intermittent every 12 hours  cefepime   IVPB      chlorhexidine 4% Liquid 1 Application(s) Topical daily  dextrose 5%. 1000 milliLiter(s) (50 mL/Hr) IV Continuous <Continuous>  dextrose 50% Injectable 12.5 Gram(s) IV Push once  dextrose 50% Injectable 25 Gram(s) IV Push once  dextrose 50% Injectable 25 Gram(s) IV Push once  heparin  Injectable 5000 Unit(s) SubCutaneous every 12 hours  influenza   Vaccine 0.5 milliLiter(s) IntraMuscular once  insulin lispro (HumaLOG) corrective regimen sliding scale   SubCutaneous three times a day before meals  insulin lispro (HumaLOG) corrective regimen sliding scale   SubCutaneous at bedtime  polyethylene glycol 3350 17 Gram(s) Oral daily  senna 2 Tablet(s) Oral at bedtime  sodium chloride 0.9%. 1000 milliLiter(s) (100 mL/Hr) IV Continuous <Continuous>    MEDICATIONS  (PRN):  dextrose 40% Gel 15 Gram(s) Oral once PRN Blood Glucose LESS THAN 70 milliGRAM(s)/deciliter  glucagon  Injectable 1 milliGRAM(s) IntraMuscular once PRN Glucose LESS THAN 70 milligrams/deciliter      Allergies    No Known Allergies    Intolerances        Vital Signs Last 24 Hrs  T(C): 36.9 (2019 16:11), Max: 37.2 (2019 00:05)  T(F): 98.5 (2019 16:11), Max: 99 (2019 00:05)  HR: 68 (2019 16:11) (68 - 75)  BP: 131/71 (2019 16:11) (131/71 - 149/71)  BP(mean): --  RR: 18 (2019 16:11) (18 - 18)  SpO2: 97% (2019 16:11) (95% - 97%)        LABS:                          8.5    3.40  )-----------( 126      ( 2019 09:36 )             23.8         Mean Cell Volume : 74.1 fl  Mean Cell Hemoglobin : 26.5 pg  Mean Cell Hemoglobin Concentration : 35.7 gm/dL  Auto Neutrophil # : x  Auto Lymphocyte # : x  Auto Monocyte # : x  Auto Eosinophil # : x  Auto Basophil # : x  Auto Neutrophil % : x  Auto Lymphocyte % : x  Auto Monocyte % : x  Auto Eosinophil % : x  Auto Basophil % : x    Serial CBC's   @ 09:36  Hct-23.8 / Hgb-8.5 / Plat-126 / RBC-3.21 / WBC-3.40          Serial CBC's   @ 10:31  Hct-21.3 / Hgb-7.7 / Plat-121 / RBC-2.92 / WBC-3.88          Serial CBC's  11-15 @ 21:29  Hct-22.2 / Hgb-8.1 / Plat-107 / RBC-3.13 / WBC-4.86          Serial CBC's  11-15 @ 12:12  Hct-20.8 / Hgb-7.6 / Plat-114 / RBC-2.93 / WBC-5.33          Serial CBC's  11-15 @ 09:48  Hct-20.0 / Hgb-7.0 / Plat-100 / RBC-2.81 / WBC-5.01                140  |  107  |  10  ----------------------------<  217<H>  3.5   |  20<L>  |  1.15    Ca    8.9      2019 07:11            WBC Count: 3.40 K/uL (19 @ 09:36)  Hemoglobin: 8.5 g/dL (19 @ 09:36)  Hematocrit: 23.8 % (19 @ 09:36)  Platelet Count - Automated: 126 K/uL (19 @ 09:36)  WBC Count: 3.88 K/uL (19 @ 10:31)  Hemoglobin: 7.7 g/dL (19 @ 10:31)  Hematocrit: 21.3 % (19 @ 10:31)  Platelet Count - Automated: 121 K/uL (19 10:31)  Reticulocyte Percent: 0.4 % (11-16-19 @ 10:31)  Iron - Total Binding Capacity.: 205 ug/dL (19 @ 10:04)  Vitamin B12, Serum: 740 pg/mL (19 @ 10:04)  Ferritin, Serum: 331 ng/mL (19 @ 10:04)  WBC Count: 4.86 K/uL (11-15-19 @ 21:29)  Hemoglobin: 8.1 g/dL (11-15-19 @ 21:29)  Hematocrit: 22.2 % (11-15-19 @ 21:29)  Platelet Count - Automated: 107 K/uL (11-15-19 @ 21:29)  WBC Count: 5.33 K/uL (11-15-19 @ 12:12)  Hemoglobin: 7.6 g/dL (11-15-19 @ 12:12)  Hematocrit: 20.8 % (11-15-19 @ 12:12)  Platelet Count - Automated: 114 K/uL (11-15-19 @ 12:12)  WBC Count: 5.01 K/uL (11-15-19 @ 09:48)  Hemoglobin: 7.0 g/dL (11-15-19 @ 09:48)  Hematocrit: 20.0 % (11-15-19 @ 09:48)  Platelet Count - Automated: 100 K/uL (11-15-19 @ 09:48)  WBC Count: 7.80 K/uL (19 @ 15:58)  Hemoglobin: 9.2 g/dL (19 15:58)  Hematocrit: 25.8 % (19 @ 15:58)  Platelet Count - Automated: 142 K/uL (19 @ 15:58)      Quantitative Ig mg/dL (11-15 @ 20:38)  Quantitative IgA: 271 mg/dL (11-15 @ 20:38)  Quantitative IgM: 41 mg/dL (11-15 @ 20:38)  HEIDY Kappa: 1.80 mg/dL (11-15 @ 20:38)  HEIDY Lambda: 2.53 mg/dL (11-15 @ 20:38)  Immunofixation, Serum:   No Monoclonal Band Identified    Reference Range: None Detected (11-15 @ 20:38)  HEIDY Kappa: 1.80 mg/dL (11-15 @ 20:38)  HEIDY Lambda: 2.53 mg/dL (11-15 @ 20:38)                  RADIOLOGY & ADDITIONAL STUDIES:

## 2019-11-18 ENCOUNTER — TRANSCRIPTION ENCOUNTER (OUTPATIENT)
Age: 74
End: 2019-11-18

## 2019-11-18 VITALS
SYSTOLIC BLOOD PRESSURE: 128 MMHG | OXYGEN SATURATION: 94 % | RESPIRATION RATE: 18 BRPM | HEART RATE: 65 BPM | DIASTOLIC BLOOD PRESSURE: 69 MMHG | TEMPERATURE: 98 F

## 2019-11-18 LAB
ANION GAP SERPL CALC-SCNC: 14 MMOL/L — SIGNIFICANT CHANGE UP (ref 5–17)
BUN SERPL-MCNC: 11 MG/DL — SIGNIFICANT CHANGE UP (ref 7–23)
CALCIUM SERPL-MCNC: 9.7 MG/DL — SIGNIFICANT CHANGE UP (ref 8.4–10.5)
CHLORIDE SERPL-SCNC: 107 MMOL/L — SIGNIFICANT CHANGE UP (ref 96–108)
CO2 SERPL-SCNC: 22 MMOL/L — SIGNIFICANT CHANGE UP (ref 22–31)
CREAT SERPL-MCNC: 1.14 MG/DL — SIGNIFICANT CHANGE UP (ref 0.5–1.3)
GLUCOSE BLDC GLUCOMTR-MCNC: 213 MG/DL — HIGH (ref 70–99)
GLUCOSE BLDC GLUCOMTR-MCNC: 261 MG/DL — HIGH (ref 70–99)
GLUCOSE SERPL-MCNC: 225 MG/DL — HIGH (ref 70–99)
HCT VFR BLD CALC: 27.4 % — LOW (ref 39–50)
HGB BLD-MCNC: 9.7 G/DL — LOW (ref 13–17)
MCHC RBC-ENTMCNC: 26.4 PG — LOW (ref 27–34)
MCHC RBC-ENTMCNC: 35.4 GM/DL — SIGNIFICANT CHANGE UP (ref 32–36)
MCV RBC AUTO: 74.7 FL — LOW (ref 80–100)
PLATELET # BLD AUTO: 152 K/UL — SIGNIFICANT CHANGE UP (ref 150–400)
POTASSIUM SERPL-MCNC: 3.8 MMOL/L — SIGNIFICANT CHANGE UP (ref 3.5–5.3)
POTASSIUM SERPL-SCNC: 3.8 MMOL/L — SIGNIFICANT CHANGE UP (ref 3.5–5.3)
RBC # BLD: 3.67 M/UL — LOW (ref 4.2–5.8)
RBC # FLD: 20.5 % — HIGH (ref 10.3–14.5)
SODIUM SERPL-SCNC: 143 MMOL/L — SIGNIFICANT CHANGE UP (ref 135–145)
WBC # BLD: 4.28 K/UL — SIGNIFICANT CHANGE UP (ref 3.8–10.5)
WBC # FLD AUTO: 4.28 K/UL — SIGNIFICANT CHANGE UP (ref 3.8–10.5)

## 2019-11-18 PROCEDURE — 87581 M.PNEUMON DNA AMP PROBE: CPT

## 2019-11-18 PROCEDURE — 86334 IMMUNOFIX E-PHORESIS SERUM: CPT

## 2019-11-18 PROCEDURE — 74177 CT ABD & PELVIS W/CONTRAST: CPT

## 2019-11-18 PROCEDURE — 83521 IG LIGHT CHAINS FREE EACH: CPT

## 2019-11-18 PROCEDURE — 82330 ASSAY OF CALCIUM: CPT

## 2019-11-18 PROCEDURE — 87798 DETECT AGENT NOS DNA AMP: CPT

## 2019-11-18 PROCEDURE — 86850 RBC ANTIBODY SCREEN: CPT

## 2019-11-18 PROCEDURE — 86900 BLOOD TYPING SEROLOGIC ABO: CPT

## 2019-11-18 PROCEDURE — 86803 HEPATITIS C AB TEST: CPT

## 2019-11-18 PROCEDURE — 82803 BLOOD GASES ANY COMBINATION: CPT

## 2019-11-18 PROCEDURE — 80048 BASIC METABOLIC PNL TOTAL CA: CPT

## 2019-11-18 PROCEDURE — 84132 ASSAY OF SERUM POTASSIUM: CPT

## 2019-11-18 PROCEDURE — 86901 BLOOD TYPING SEROLOGIC RH(D): CPT

## 2019-11-18 PROCEDURE — 85730 THROMBOPLASTIN TIME PARTIAL: CPT

## 2019-11-18 PROCEDURE — 82784 ASSAY IGA/IGD/IGG/IGM EACH: CPT

## 2019-11-18 PROCEDURE — 85027 COMPLETE CBC AUTOMATED: CPT

## 2019-11-18 PROCEDURE — 85610 PROTHROMBIN TIME: CPT

## 2019-11-18 PROCEDURE — 84100 ASSAY OF PHOSPHORUS: CPT

## 2019-11-18 PROCEDURE — 82962 GLUCOSE BLOOD TEST: CPT

## 2019-11-18 PROCEDURE — 85014 HEMATOCRIT: CPT

## 2019-11-18 PROCEDURE — 36430 TRANSFUSION BLD/BLD COMPNT: CPT

## 2019-11-18 PROCEDURE — 87086 URINE CULTURE/COLONY COUNT: CPT

## 2019-11-18 PROCEDURE — 83010 ASSAY OF HAPTOGLOBIN QUANT: CPT

## 2019-11-18 PROCEDURE — 83540 ASSAY OF IRON: CPT

## 2019-11-18 PROCEDURE — 81001 URINALYSIS AUTO W/SCOPE: CPT

## 2019-11-18 PROCEDURE — 71045 X-RAY EXAM CHEST 1 VIEW: CPT

## 2019-11-18 PROCEDURE — 83550 IRON BINDING TEST: CPT

## 2019-11-18 PROCEDURE — 83605 ASSAY OF LACTIC ACID: CPT

## 2019-11-18 PROCEDURE — 87486 CHLMYD PNEUM DNA AMP PROBE: CPT

## 2019-11-18 PROCEDURE — 85045 AUTOMATED RETICULOCYTE COUNT: CPT

## 2019-11-18 PROCEDURE — 82947 ASSAY GLUCOSE BLOOD QUANT: CPT

## 2019-11-18 PROCEDURE — 87040 BLOOD CULTURE FOR BACTERIA: CPT

## 2019-11-18 PROCEDURE — 84295 ASSAY OF SERUM SODIUM: CPT

## 2019-11-18 PROCEDURE — 93005 ELECTROCARDIOGRAM TRACING: CPT

## 2019-11-18 PROCEDURE — 99232 SBSQ HOSP IP/OBS MODERATE 35: CPT

## 2019-11-18 PROCEDURE — 87633 RESP VIRUS 12-25 TARGETS: CPT

## 2019-11-18 PROCEDURE — 82272 OCCULT BLD FECES 1-3 TESTS: CPT

## 2019-11-18 PROCEDURE — P9016: CPT

## 2019-11-18 PROCEDURE — 83735 ASSAY OF MAGNESIUM: CPT

## 2019-11-18 PROCEDURE — 80053 COMPREHEN METABOLIC PANEL: CPT

## 2019-11-18 PROCEDURE — 82728 ASSAY OF FERRITIN: CPT

## 2019-11-18 PROCEDURE — 99285 EMERGENCY DEPT VISIT HI MDM: CPT

## 2019-11-18 PROCEDURE — 82435 ASSAY OF BLOOD CHLORIDE: CPT

## 2019-11-18 PROCEDURE — 82607 VITAMIN B-12: CPT

## 2019-11-18 PROCEDURE — 86923 COMPATIBILITY TEST ELECTRIC: CPT

## 2019-11-18 PROCEDURE — 83036 HEMOGLOBIN GLYCOSYLATED A1C: CPT

## 2019-11-18 RX ORDER — SENNA PLUS 8.6 MG/1
2 TABLET ORAL
Qty: 60 | Refills: 0
Start: 2019-11-18 | End: 2019-12-17

## 2019-11-18 RX ORDER — POLYETHYLENE GLYCOL 3350 17 G/17G
17 POWDER, FOR SOLUTION ORAL
Qty: 529 | Refills: 0
Start: 2019-11-18 | End: 2019-12-17

## 2019-11-18 RX ORDER — CEFUROXIME AXETIL 250 MG
1 TABLET ORAL
Qty: 6 | Refills: 0
Start: 2019-11-18 | End: 2019-11-20

## 2019-11-18 RX ADMIN — AMLODIPINE BESYLATE 10 MILLIGRAM(S): 2.5 TABLET ORAL at 05:54

## 2019-11-18 RX ADMIN — CHLORHEXIDINE GLUCONATE 1 APPLICATION(S): 213 SOLUTION TOPICAL at 12:39

## 2019-11-18 RX ADMIN — Medication 2: at 14:32

## 2019-11-18 RX ADMIN — Medication 3: at 09:30

## 2019-11-18 RX ADMIN — HEPARIN SODIUM 5000 UNIT(S): 5000 INJECTION INTRAVENOUS; SUBCUTANEOUS at 05:54

## 2019-11-18 RX ADMIN — Medication 1200 MILLIGRAM(S): at 05:58

## 2019-11-18 RX ADMIN — SODIUM CHLORIDE 100 MILLILITER(S): 9 INJECTION INTRAMUSCULAR; INTRAVENOUS; SUBCUTANEOUS at 12:40

## 2019-11-18 RX ADMIN — CEFEPIME 100 MILLIGRAM(S): 1 INJECTION, POWDER, FOR SOLUTION INTRAMUSCULAR; INTRAVENOUS at 05:54

## 2019-11-18 RX ADMIN — Medication 81 MILLIGRAM(S): at 12:39

## 2019-11-18 NOTE — PROGRESS NOTE ADULT - MS EXT PE MLT D E PC
no cyanosis/no pedal edema
no pedal edema/no cyanosis
no cyanosis/no pedal edema
no cyanosis/no pedal edema

## 2019-11-18 NOTE — DISCHARGE NOTE NURSING/CASE MANAGEMENT/SOCIAL WORK - PATIENT PORTAL LINK FT
You can access the FollowMyHealth Patient Portal offered by [facility] by registering at the following website: [email or web address]. By joining [employer]’s FollowMyHealth portal, you will also be able to view your health information using other applications (apps) compatible with our system.

## 2019-11-18 NOTE — PROGRESS NOTE ADULT - ASSESSMENT
74y Male with history of HTN, DM, BPH, sickle-cell trait presents with sepsis secondary to UTI. Nephrology consulted for elevated Scr.    1. FABIOLA - likely hemodynamically mediated in setting of sepsis now resolved. Scr stable despite CT with IV contrast on 11/17. Avoid nephrotoxins.    2. CKD-2 - Baseline Scr 1.0-1.2. Renal function at baseline. Defer inpatient work up.    3. HTN 2/2 CKD - BP acceptable. Holding home ACE-I but can resume as an outpatient if BP increases. Monitor BP.    4. Sepsis 2/2 UTI - Abx as per ID.

## 2019-11-18 NOTE — DISCHARGE NOTE PROVIDER - CARE PROVIDER_API CALL
Jayna Laboy)  Medical Oncology  [facility] [place] [address]  Phone: [phone]  Fax: [phone]  Follow Up Time:

## 2019-11-18 NOTE — PROGRESS NOTE ADULT - ASSESSMENT
74M DM, HTN, BPH +FH prostate cancer s/p cystoscopy for microscopic hematuria now with fever.  Suspect UTI, however, cultures remain negative.  They might be sterile due to prior antibiotics given - clinically better.  Today is D#4 antibiotics    Suggest:  - cefepime okay for now  - can change to ceftin 500mg bid to complete 7 days when he is stable for discharge.

## 2019-11-18 NOTE — DISCHARGE NOTE PROVIDER - HOSPITAL COURSE
73 yo male with PMH of T2DM, HTN, BPH, sickle cell train, presents here with fever and urinary frequency.     Problem/Plan - 1:    ·  Problem: UTI (urinary tract infection).Continue ceftin as  ordered         Problem/Plan - 2:    ·  Problem: Sepsis.  resolved         Problem/Plan - 3:    ·  Problem: Acute kidney injury.  improved     Problem/Plan - 4:    ·  Problem: T2DM (type 2 diabetes mellitus).  cont home meds             Problem/Plan - 5:    ·  Problem: Hypertension.  Plan: hold quinapril in setting of FABIOLA     c/w amlodipine for now.    Cleared by Dr Laboy for discharge home

## 2019-11-18 NOTE — DISCHARGE NOTE PROVIDER - NSDCFUADDAPPT_GEN_ALL_CORE_FT
Follow up with Dr Laboy and Dr Bettencourt within 5 days of discharge.  Take all medications as ordered

## 2019-11-18 NOTE — PROGRESS NOTE ADULT - SUBJECTIVE AND OBJECTIVE BOX
INTERVAL HPI/OVERNIGHT EVENTS: patient is resting comfortable                             denies any abd pain no n/v no rectal blding    MEDICATIONS  (STANDING):  acetylcysteine  Oral Solution 1200 milliGRAM(s) Oral <User Schedule>  amLODIPine   Tablet 10 milliGRAM(s) Oral daily  aspirin enteric coated 81 milliGRAM(s) Oral daily  cefepime   IVPB 2000 milliGRAM(s) IV Intermittent every 12 hours  cefepime   IVPB      chlorhexidine 4% Liquid 1 Application(s) Topical daily  dextrose 5%. 1000 milliLiter(s) (50 mL/Hr) IV Continuous <Continuous>  dextrose 50% Injectable 12.5 Gram(s) IV Push once  dextrose 50% Injectable 25 Gram(s) IV Push once  dextrose 50% Injectable 25 Gram(s) IV Push once  heparin  Injectable 5000 Unit(s) SubCutaneous every 12 hours  influenza   Vaccine 0.5 milliLiter(s) IntraMuscular once  insulin lispro (HumaLOG) corrective regimen sliding scale   SubCutaneous three times a day before meals  insulin lispro (HumaLOG) corrective regimen sliding scale   SubCutaneous at bedtime  polyethylene glycol 3350 17 Gram(s) Oral daily  senna 2 Tablet(s) Oral at bedtime  sodium chloride 0.9%. 1000 milliLiter(s) (100 mL/Hr) IV Continuous <Continuous>    MEDICATIONS  (PRN):  dextrose 40% Gel 15 Gram(s) Oral once PRN Blood Glucose LESS THAN 70 milliGRAM(s)/deciliter  glucagon  Injectable 1 milliGRAM(s) IntraMuscular once PRN Glucose LESS THAN 70 milligrams/deciliter      Allergies    No Known Allergies    Intolerances            PHYSICAL EXAM:   Vital Signs:  Vital Signs Last 24 Hrs  T(C): 37.1 (17 Nov 2019 23:51), Max: 37.1 (17 Nov 2019 23:51)  T(F): 98.7 (17 Nov 2019 23:51), Max: 98.7 (17 Nov 2019 23:51)  HR: 73 (17 Nov 2019 23:51) (68 - 75)  BP: 131/65 (17 Nov 2019 23:51) (131/65 - 141/81)  BP(mean): --  RR: 18 (17 Nov 2019 23:51) (18 - 18)  SpO2: 94% (17 Nov 2019 23:51) (94% - 97%)  Daily     Daily     GENERAL:  no distress  HEENT:  NC/AT,  anicteric  CHEST:  breath sounds clear  HEART:  Regular rhythm  ABDOMEN:  Soft, non-tender, bowel sounds present,   EXTEREMITIES:  no edema      LABS:                        8.5    3.40  )-----------( 126      ( 17 Nov 2019 09:36 )             23.8     11-18    143  |  107  |  11  ----------------------------<  225<H>  3.8   |  22  |  1.14    Ca    9.7      18 Nov 2019 07:19            RADIOLOGY & ADDITIONAL TESTS:

## 2019-11-18 NOTE — PROGRESS NOTE ADULT - REASON FOR ADMISSION
increased urinary frequency

## 2019-11-18 NOTE — PROGRESS NOTE ADULT - PROBLEM SELECTOR PLAN 1
Ct scan results pending  labs from today are pending  GI w/u as out patient provided no new findings
Patient with UTI, recent hx of instrumentation  will broaden Abx coverage to cover pseudomonas  start IV cefepime  f/u urine culture  f/u blood culture  c/w IVF

## 2019-11-18 NOTE — PROGRESS NOTE ADULT - PROBLEM SELECTOR PROBLEM 1
Anemia, chronic disease
UTI (urinary tract infection)

## 2019-11-18 NOTE — PROGRESS NOTE ADULT - ATTENDING COMMENTS
[facility] NEPHROLOGY  Pavel White M.D.  Sung Gaines D.O.  Melissa Romano M.D.  Giovanna Carpio, MSN, ANP-C  [phone]    [address] [place] [address]
Isis Houser  Pager: [phone]. If no response or past 5 pm call [phone].

## 2019-11-18 NOTE — PROGRESS NOTE ADULT - SUBJECTIVE AND OBJECTIVE BOX
[facility] NEPHROLOGY- PROGRESS NOTE    74y Male with history of HTN, DM, BPH, sickle-cell trait presents with sepsis secondary to UTI. Nephrology consulted for elevated Scr.    REVIEW OF SYSTEMS:  Gen: no changes in weight  Cards: no chest pain  Resp: no dyspnea  GI: no nausea or vomiting or diarrhea  Vascular: no LE edema    No Known Allergies      Hospital Medications: Medications reviewed    VITALS:  T(F): 98.5 (19 @ 08:46), Max: 98.7 (19 @ 23:51)  HR: 71 (19 @ 08:46)  BP: 128/78 (19 @ 08:46)  RR: 18 (19 08:46)  SpO2: 96% (19 08:46)  Wt(kg): --  Height (cm): 166.4 (11-15 @ 03:28)  Weight (kg): 85.8 (11-15 @ 03:28)  BMI (kg/m2): 31 (11-15 @ 03:28)  BSA (m2): 1.94 (11-15 @ 03:28)     @ 07:01  -   @ 07:00  --------------------------------------------------------  IN: 1400 mL / OUT: 1200 mL / NET: 200 mL     @ 07:01  -   @ 13:44  --------------------------------------------------------  IN: 180 mL / OUT: 300 mL / NET: -120 mL        PHYSICAL EXAM:    Gen: NAD, calm  Cards: RRR, +S1/S2, no M/G/R  Resp: CTA B/L  GI: soft, NT/ND, NABS  Vascular: no LE edema B/L    LABS:      143  |  107  |  11  ----------------------------<  225<H>  3.8   |  22  |  1.14    Ca    9.7      2019 07:19      Creatinine Trend: 1.14 <--, 1.15 <--, 1.27 <--, 1.46 <--, 1.70 <--                        9.7    4.28  )-----------( 152      ( 2019 09:49 )             27.4     Urine Studies:  Urinalysis Basic - ( 2019 19:52 )    Color: Dark Orange / Appearance: Clear / S.015 / pH:   Gluc:  / Ketone: Negative  / Bili: Small / Urobili: 2 mg/dL   Blood:  / Protein: 30 mg/dL / Nitrite: Positive   Leuk Esterase: Moderate / RBC: 2 /hpf / WBC 14 /HPF   Sq Epi:  / Non Sq Epi: 1 /hpf / Bacteria: Moderate          RADIOLOGY & ADDITIONAL STUDIES:  < from: CT Abdomen and Pelvis w/ IV Cont (19 @ 15:51) >  IMPRESSION:     Acute cystitis.    Probable bilateral pyelonephritis.    < end of copied text >

## 2019-11-18 NOTE — PROGRESS NOTE ADULT - ASSESSMENT
73 yo male with PMH of T2DM, HTN, BPH, sickle cell train, presents here with fever and urinary frequency. s/p cystoscopy on 11-6-19 for microscopic hematuria. kirti procedure antibiotics.     11-15-19: ID consult appreciated. Anemia w/u. + Hx splenomegaly. US Abd. Will transfuse 1 u PRBCs today  11-16-19: Will get nephrology consult before IV contrast given. Dr Melissa Romano will see him tomorrow. Dr Mark Murray will see him for GI consult. Senna / colace for BM. Ambulate.  11-17-19:  Both GI and nephrology consults appreciated. Good BMx2 today S/P CTs  11-18-19: A copy of Ct results given to pt. Acute cystitis/pyelonephritis. Changes to Ceftin 500 mg BID x 2 days. d/c home

## 2019-11-18 NOTE — PROGRESS NOTE ADULT - SUBJECTIVE AND OBJECTIVE BOX
Patient is a 74y old  Male who presents with a chief complaint of increased urinary frequency (2019 22:31)    f/u fever/uti    Interval History/ROS:  no fever.  urinating normally.  feels well.  did receive PRBC over the weekend for anemia.  CT done did not show hematoma but did see findings c/w cystitis/pyelonephritis.  Remainder of ROS otherwise negative.    PAST MEDICAL & SURGICAL HISTORY:  Arthritis  BPH (benign prostatic hyperplasia)  Sickle cell trait  Hypertension  Diabetes: type 2  H/O cystoscopy  S/P hernia repair    Allergies  No Known Allergies    ANTIMICROBIALS:  cefTRIAXone   IVPB x1 11/15  cefepime   IVPB 2000 every 12 hours (11/15-)    MEDICATIONS  (STANDING):  amLODIPine   Tablet 10 daily  aspirin enteric coated 81 daily  heparin  Injectable 5000 every 12 hours  influenza   Vaccine 0.5 once  insulin lispro (HumaLOG) corrective regimen sliding scale  three times a day before meals  insulin lispro (HumaLOG) corrective regimen sliding scale  at bedtime  polyethylene glycol 3350 17 daily  senna 2 at bedtime    Vital Signs Last 24 Hrs  T(F): 98.5 (19 @ 08:46), Max: 98.7 (19 @ 23:51)  HR: 71 (19 @ 08:46)  BP: 128/78 (19 @ 08:46)  RR: 18 (19 @ 08:46)  SpO2: 96% (19 @ 08:46) (94% - 97%)    PHYSICAL EXAM:  General: non-toxic  HEAD/EYES: anicteric  ENT:  supple  Cardiovascular:   S1, S2  Respiratory:  clear bilaterally  GI:  soft, non-tender, normal bowel sounds  :  no logan  Musculoskeletal:  no synovitis  Neurologic:  grossly non-focal  Skin:  no rash  Psychiatric:  appropriate affect  Vascular:  no phlebitis                        9.7    4.28  )-----------( 152      ( 2019 09:49 )             27.4     143  |  107  |  11  ----------------------------<  225  3.8   |  22  |  1.14  Ca    9.7      2019 07:19    Urinalysis Basic - ( 2019 19:52 )  Color: Dark Orange / Appearance: Clear / S.015 / pH: x  Gluc: x / Ketone: Negative  / Bili: Small / Urobili: 2 mg/dL   Blood: x / Protein: 30 mg/dL / Nitrite: Positive   Leuk Esterase: Moderate / RBC: 2 /hpf / WBC 14 /HPF   Sq Epi: x / Non Sq Epi: 1 /hpf / Bacteria: Moderate    MICROBIOLOGY:  Specimen Source: .Urine Clean Catch (Midstream) (19 @ 23:00)    Culture - Blood (19 @ 22:28)    Specimen Source: .Blood Blood-Peripheral    Culture Results:   No growth to date.    Culture - Blood (19 @ 22:28)    Specimen Source: .Blood Blood-Peripheral    Culture Results:   No growth to date.    Rapid RVP Result: NotDetec ( @ 17:29)    RADIOLOGY:  CT Abdomen and Pelvis w/ IV Cont (19 @ 15:51) >  IMPRESSION:  Acute cystitis.  Probable bilateral pyelonephritis.    Xray Chest 1 View-PORTABLE IMMEDIATE (19 @ 16:13) >  IMPRESSION:  Clear lungs

## 2019-11-18 NOTE — PROGRESS NOTE ADULT - SUBJECTIVE AND OBJECTIVE BOX
Patient is a 74y old  Male who presents with a chief complaint of increased urinary frequency (2019 12:47)       Pt is seen and examined  pt is awake and itting up in chair  pt seems comfortable    MEDICATIONS  (STANDING):  acetylcysteine  Oral Solution 1200 milliGRAM(s) Oral <User Schedule>  amLODIPine   Tablet 10 milliGRAM(s) Oral daily  aspirin enteric coated 81 milliGRAM(s) Oral daily  cefepime   IVPB 2000 milliGRAM(s) IV Intermittent every 12 hours  cefepime   IVPB      chlorhexidine 4% Liquid 1 Application(s) Topical daily  dextrose 5%. 1000 milliLiter(s) (50 mL/Hr) IV Continuous <Continuous>  dextrose 50% Injectable 12.5 Gram(s) IV Push once  dextrose 50% Injectable 25 Gram(s) IV Push once  dextrose 50% Injectable 25 Gram(s) IV Push once  heparin  Injectable 5000 Unit(s) SubCutaneous every 12 hours  influenza   Vaccine 0.5 milliLiter(s) IntraMuscular once  insulin lispro (HumaLOG) corrective regimen sliding scale   SubCutaneous three times a day before meals  insulin lispro (HumaLOG) corrective regimen sliding scale   SubCutaneous at bedtime  polyethylene glycol 3350 17 Gram(s) Oral daily  senna 2 Tablet(s) Oral at bedtime  sodium chloride 0.9%. 1000 milliLiter(s) (100 mL/Hr) IV Continuous <Continuous>    MEDICATIONS  (PRN):  dextrose 40% Gel 15 Gram(s) Oral once PRN Blood Glucose LESS THAN 70 milliGRAM(s)/deciliter  glucagon  Injectable 1 milliGRAM(s) IntraMuscular once PRN Glucose LESS THAN 70 milligrams/deciliter      Allergies    No Known Allergies    Intolerances        Vital Signs Last 24 Hrs  T(C): 36.9 (2019 08:46), Max: 37.1 (2019 23:51)  T(F): 98.5 (2019 08:46), Max: 98.7 (2019 23:51)  HR: 71 (2019 08:46) (68 - 73)  BP: 128/78 (2019 08:46) (128/78 - 131/71)  BP(mean): --  RR: 18 (2019 08:46) (18 - 18)  SpO2: 96% (2019 08:46) (94% - 97%)        LABS:                          9.7    4.28  )-----------( 152      ( 2019 09:49 )             27.4         Mean Cell Volume : 74.7 fl  Mean Cell Hemoglobin : 26.4 pg  Mean Cell Hemoglobin Concentration : 35.4 gm/dL  Auto Neutrophil # : x  Auto Lymphocyte # : x  Auto Monocyte # : x  Auto Eosinophil # : x  Auto Basophil # : x  Auto Neutrophil % : x  Auto Lymphocyte % : x  Auto Monocyte % : x  Auto Eosinophil % : x  Auto Basophil % : x    Serial CBC's   @ 09:49  Hct-27.4 / Hgb-9.7 / Plat-152 / RBC-3.67 / WBC-4.28          Serial CBC's   @ 09:36  Hct-23.8 / Hgb-8.5 / Plat-126 / RBC-3.21 / WBC-3.40          Serial CBC's   @ 10:31  Hct-21.3 / Hgb-7.7 / Plat-121 / RBC-2.92 / WBC-3.88          Serial CBC's  11-15 @ 21:29  Hct-22.2 / Hgb-8.1 / Plat-107 / RBC-3.13 / WBC-4.86          Serial CBC's  11-15 @ 12:12  Hct-20.8 / Hgb-7.6 / Plat-114 / RBC-2.93 / WBC-5.33                143  |  107  |  11  ----------------------------<  225<H>  3.8   |  22  |  1.14    Ca    9.7      2019 07:19            WBC Count: 4.28 K/uL (19 @ 09:49)  Hemoglobin: 9.7 g/dL (19 @ 09:49)  Hematocrit: 27.4 % (19 @ 09:49)  Platelet Count - Automated: 152 K/uL (19 @ 09:49)  WBC Count: 3.40 K/uL (19 @ 09:36)  Hemoglobin: 8.5 g/dL (19 @ 09:36)  Hematocrit: 23.8 % (19 @ 09:36)  Platelet Count - Automated: 126 K/uL (19 @ 09:36)  WBC Count: 3.88 K/uL (19 @ 10:31)  Hemoglobin: 7.7 g/dL (19 @ 10:31)  Hematocrit: 21.3 % (19 @ 10:31)  Platelet Count - Automated: 121 K/uL (19 @ 10:31)  Reticulocyte Percent: 0.4 % (19 10:31)  Iron - Total Binding Capacity.: 205 ug/dL (19 @ 10:04)  Vitamin B12, Serum: 740 pg/mL (19 @ 10:04)  Ferritin, Serum: 331 ng/mL (19 @ 10:04)  WBC Count: 4.86 K/uL (11-15-19 @ 21:29)  Hemoglobin: 8.1 g/dL (11-15-19 @ 21:29)  Hematocrit: 22.2 % (11-15-19 @ 21:29)  Platelet Count - Automated: 107 K/uL (11-15-19 @ 21:29)  WBC Count: 5.33 K/uL (11-15-19 @ 12:12)  Hemoglobin: 7.6 g/dL (11-15-19 @ 12:12)  Hematocrit: 20.8 % (11-15-19 @ 12:12)  Platelet Count - Automated: 114 K/uL (11-15-19 @ 12:12)  WBC Count: 5.01 K/uL (11-15-19 @ 09:48)  Hemoglobin: 7.0 g/dL (11-15-19 @ 09:48)  Hematocrit: 20.0 % (11-15-19 @ 09:48)  Platelet Count - Automated: 100 K/uL (11-15-19 @ 09:48)  WBC Count: 7.80 K/uL (19 @ 15:58)  Hemoglobin: 9.2 g/dL (19 @ 15:58)  Hematocrit: 25.8 % (19 @ 15:58)  Platelet Count - Automated: 142 K/uL (19 @ 15:58)      Quantitative Ig mg/dL (11-15 @ 20:38)  Quantitative IgA: 271 mg/dL (11-15 @ 20:38)  Quantitative IgM: 41 mg/dL (11-15 @ 20:38)  HEIDY Kappa: 1.80 mg/dL (11-15 @ 20:38)  HEIDY Lambda: 2.53 mg/dL (11-15 @ 20:38)  Immunofixation, Serum:   No Monoclonal Band Identified    Reference Range: None Detected (11-15 @ 20:38)  HEIDY Kappa: 1.80 mg/dL (11-15 @ 20:38)  HEIDY Lambda: 2.53 mg/dL (11-15 @ 20:38)                  RADIOLOGY & ADDITIONAL STUDIES:

## 2019-11-18 NOTE — PROGRESS NOTE ADULT - PSYCHIATRIC DETAILS
normal affect/normal behavior
normal affect/normal behavior
normal behavior/normal affect
normal behavior/normal affect

## 2019-11-18 NOTE — DISCHARGE NOTE PROVIDER - NSDCCPCAREPLAN_GEN_ALL_CORE_FT
PRINCIPAL DISCHARGE DIAGNOSIS  Diagnosis: Sepsis without acute organ dysfunction, due to unspecified organism  Assessment and Plan of Treatment: resolved, continue ceftin as ordered      SECONDARY DISCHARGE DIAGNOSES  Diagnosis: UTI (urinary tract infection)  Assessment and Plan of Treatment: UTI (urinary tract infection)    Diagnosis: Acute kidney injury  Assessment and Plan of Treatment: Acute kidney injury    Diagnosis: Hypertension  Assessment and Plan of Treatment: Hypertension    Diagnosis: T2DM (type 2 diabetes mellitus)  Assessment and Plan of Treatment: T2DM (type 2 diabetes mellitus)

## 2019-11-18 NOTE — DISCHARGE NOTE PROVIDER - NSDCMRMEDTOKEN_GEN_ALL_CORE_FT
Actos 30 mg oral tablet: 1 tab(s) orally once a day  aspirin 81 mg oral tablet: 1 tab(s) orally once a day  cefuroxime 500 mg oral tablet: 1 tab(s) orally 2 times a day   glipiZIDE 10 mg oral tablet: 1 tab(s) orally once a day (at bedtime)  glipiZIDE 5 mg oral tablet: 1 tab(s) orally once a day  Norvasc 10 mg oral tablet: 1 tab(s) orally once a day  polyethylene glycol 3350 oral powder for reconstitution: 17 gram(s) orally once a day  senna oral tablet: 2 tab(s) orally once a day (at bedtime)

## 2019-11-19 LAB
CULTURE RESULTS: SIGNIFICANT CHANGE UP
CULTURE RESULTS: SIGNIFICANT CHANGE UP
SPECIMEN SOURCE: SIGNIFICANT CHANGE UP
SPECIMEN SOURCE: SIGNIFICANT CHANGE UP

## 2022-10-27 NOTE — DISCHARGE NOTE NURSING/CASE MANAGEMENT/SOCIAL WORK - NSDCPETBCESMAN_GEN_ALL_CORE
If you are a smoker, it is important for your health to stop smoking. Please be aware that second hand smoke is also harmful. Winlevi Counseling:  I discussed with the patient the risks of topical clascoterone including but not limited to erythema, scaling, itching, and stinging. Patient voiced their understanding.

## 2023-06-13 NOTE — PATIENT PROFILE ADULT - LAST BOWEL MOVEMENT DATE
[facility]  Speech Therapy Treatment Note- Neurological Rehabilitation    Date: 6/12/2023     Name: Leslie Wood   MRN: [id number]   Therapy Diagnosis:   Encounter Diagnoses   Name Primary?    Aphasia Yes    Cognitive communication disorder        Physician: Sammy Juarez MD  Physician Orders: [id number] - AMB REFERRAL/CONSULT TO SPEECH THERAPY  Medical Diagnosis: R47.01 (ICD-10-CM) - Aphasia    Visit #/ Visits Authorized 24/40  Date of Evaluation:  2/17/2023   Insurance Authorization Period: 2/23/2023 - 12/31/2023   Plan of Care Expiration Date: 6/26/2023   Extended Plan of Care:  NA   Progress Note: 6/19/2023  Visits Cancelled: 0  Visits No Show: 0    Time In:  3:00 PM   Time Out: 3:45 PM  TOTAL TIME: 45 minutes      Precautions: Standard  Subjective:   Patient reported to her session with her granddaughter. She was compliant to home exercise program.     Response to previous treatment: continued progress towards goals    Pain Scale:  2/10 on a Visual Analog Scale currently.   Pain Location:  right knee/back  Objective:   TIMED  Procedure Min.   Cognitive Therapeutic Interventions, first 15 minutes CPT 66335  15   Cognitive Therapeutic Interventions, each additional 15 minutes CPT 19763  30         UNTIMED  Procedure Min.   Speech- Language- Voice Therapy  0     Total Timed Units: 3  Total Untimed Units: 0  Charges Billed/Number of units: 14244/1; 94702/2    Short Term Goals: (6 weeks) Current Progress:   Patient will describe functional objects/images to facilitate a strategy of description in order to increase transfer of intended message with 80% accuracy and moderate clinician cueing.     Progressing/ Not Met 6/12/2023   Not formally addressed.     Patient will complete word finding task (i.e. Creating subject, verb, object pairs in VNEST protocol) with 90% accuracy moderate assistance to improve word fluency.       GOAL MET 5/22/2023 Completed today X4. Patient able to create simple sentences with  95% accuracy independently. She required additional cueing to expand sentences with when, where, or why. She was able to expand sentences with 90% accuracy given minimal-moderate cueing.     GOAL MET 5/22/2023   Patient will utilize word finding strategies in structured tasks with 70% accuracy and minimal cues.      Progressing/ Not Met 6/12/2023   Not formally addressed.    Patient will summarize read material to improve word fluency, word finding, and reading comprehension in Attentive Reading and Constrained Summarization (ARCS) protocol with 80% accuracy and minimal verbal assistance across 2-3 sessions.     Progressing/ Not Met 6/12/2023   Not formally addressed.      Patient will write 7-10 word sentences with no more than 2 syntactic/grammatical errors given minimal cueing.      Progressing/ Not Met 6/12/2023   Not formally addressed.      Patient will complete functional writing task with complete, coherent, and accurately spelled responses with 80% accuracy and minimal verbal and visual assistance across 3-5 sessions.     Progressing/ Not Met 6/12/2023   Not formally addressed.      Patient will complete RBANS assessment to further assess cognitive communication skills.        GOAL MET 2/27/2023  Completed today. See below for results.       GOAL MET 2/27/2023    Patient will sustain attention to a moderate task (auditory or visual) for 2 minutes with 90% accuracy or no more than 1 redirection.     Visual: GOAL MET 3/20/2023  Auditory: GOAL MET 3/20/2023  VISUAL:   Task 1: Visual sustained attention task completed in which patient visually scanned an article looking for certain letters. She completed this task with 87% accuracy independently (36/41). Clinician declan a purple line on the left hand side to serve an anchor. Patient utilized a blank paper to keep track of which line she was working on.     Task 2: Visual sustained attention task completed in which patient visually scanned a phone bill looking  for unexplained calls. She completed this task with 90% accuracy given minimal cueing. The one error that she made was due to skipping over an area code that was more offset to the left than the others. Clinician and patient dicussed left neglect in depth that is likely due to her first cerebral vascular accident (right cerebral vascular accident).     AUDITORY:  Task 1: Constant Therapy: Understanding Voicemails was completed today with 90% accuracy independently (improvement from last session with 70% accuracy). She required minimal assistance for navigating the application (pressing the next button, replaying the voicemail)    GOAL MET 3/20/2023    Patient will complete selective attention tasks (auditory or visual) with 90% accuracy independently increase selective attention.    Visual: GOAL MET 4/6/2023  Auditory: GOAL MET 5/12/2023  Visual:   Medication management task. Patient was asked to sort three medications into a weekly pill box using the instructions provided on each pill bottle. She completed this task with 92% accuracy (correctly sorted 26 out of 28 individual pills) independently. Clinician and patient discussed the two missed pills and potential negative consequences of missing medication.     Auditory:  Patient completed auditory selective attention task of immediate recall of short stories (3 sentences). Initially she recalled stories with 50% accuracy. Clinician and patient discussed strategies for improved accuracy such as repeating the story and using visualization. Using these strategies, she then answered questions with 90% accuracy.    Patient will use attention shifting strategies to shift attention between two tasks (auditory or visual) with no more than 3 cues or 90% accuracy to improve alternating attention. Visual:   Patient completed visual alternating attention task of using a written list of appointments to fill in a calendar with 100% accuracy given minimal verbal cueing.      Patient completed visual alternating attention task of using a written list of her upcoming therapy appointments (PT, OT, and ST) to fill in a calendar with 100% accuracy given minimal cueing from the clinician.     Patient completed visual alternating attention task of navigating directional terms (north, south, east, and west) to answer questions about a school building with 100% accuracy given consistent moderate cues.        Patient will complete short term recall tasks after a 5 minutes delay with 90% accuracy  independently  with use of memory strategies to improve recall of information and generalization of memory strategies. Not formally addressed.    Patient will use Goal Plan Action Review strategy to complete moderate to complex reasoning, planning, or organization tasks with 90% accuracy independently to improve functional executive function skills. Not formally addressed.    Patient will complete a functional task to improve attention, memory and/or executive functions (I.e. sample bill paying activity, recipe, or multiple choice comprehension questions to 1 paragraphs) with 80% accuracy and natural environment noise distractions (TV news background, music, etc.). Not formally addressed.    Patient will be educated regarding cognitive deficits as applicable to the patient's life for increased awareness and will execute returned demonstration of information with 80% accuracy.           GOAL MET 3/6/2023  Education provided regarding results from her assessment, the hierarchy of neuropsychological functions, and the role of attention in other executive functions. Patient and granddaughter verbalized understanding.     GOAL MET 3/6/2023    Patient will independently generate strategies to improve ability to complete tasks with enhanced accuracy and time, based on review of objective previous performance on trials of functional tasks with 80% accuracy.  Not formally addressed.    Given implementation of  strategies, patient will complete a task following initial attempt with 90% accuracy and reported reduced number on the relative scale of cognitive load when compared to previous trial.  Not formally addressed.    Patient will use external memory strategies in order to recall important dates, events, appointments, or tasks to be completed with 90% accuracy independently.  Not formally addressed.        Patient Education/Response:   Education provided on progress thus far. Patient verbalized understanding of all discussed.     Home program established: Continue prior program.   Exercises were reviewed and Leslie was able to demonstrate them prior to the end of the session.  Leslie demonstrated good  understanding of the education provided.     See Electronic Medical Record under Patient Instructions for exercises provided throughout therapy.  Assessment:   Patient is very motivated to improve and participates in all therapy tasks presented. She has met four goals since the initiation of therapy and continues to progress towards other goals. She reports improvements in her daily life. Discussed discharge at the end of this week with continued HEP and patient verbalized understanding and agreement.     Leslie is progressing well towards her goals. Current goals remain appropriate. Goals will be updated as needed.     Patient prognosis is Good. Patient will continue to benefit from skilled outpatient speech and language therapy to address the deficits listed in the problem list on initial evaluation, provide patient/family education and to maximize patient's level of independence in the home and community environment.     Medical necessity is demonstrated by the following IMPAIRMENTS:  Language deficits impact overall quality of life, ability to participation in social and community interactions, and the ability to communicate important medical information if needed.   Cognitive-communication deficits impact overall quality  of life and the ability to safely and independently complete activities of daily living.     Barriers to Therapy: none   Patient's spiritual, cultural and educational needs considered and patient agreeable to plan of care and goals.  Plan:   Continue Plan of Care with focus on sustained/selective attention for functional daily tasks.     Darlin Dudley, ADWOA  Student Clinician   6/13/2023      Becca Blanco, CCC-SLP, CBIS   Speech Language Pathologist   Certified Brain Injury Specialist   6/13/2023      14-Nov-2019

## 2025-08-18 ENCOUNTER — INPATIENT (INPATIENT)
Facility: HOSPITAL | Age: 80
LOS: 2 days | Discharge: HOME | DRG: 641 | End: 2025-08-21
Attending: HOSPITALIST | Admitting: STUDENT IN AN ORGANIZED HEALTH CARE EDUCATION/TRAINING PROGRAM
Payer: MEDICARE

## 2025-08-18 VITALS
HEART RATE: 86 BPM | WEIGHT: 188.05 LBS | RESPIRATION RATE: 18 BRPM | OXYGEN SATURATION: 100 % | TEMPERATURE: 98 F | DIASTOLIC BLOOD PRESSURE: 78 MMHG | HEIGHT: 66 IN | SYSTOLIC BLOOD PRESSURE: 161 MMHG

## 2025-08-18 DIAGNOSIS — Z98.890 OTHER SPECIFIED POSTPROCEDURAL STATES: Chronic | ICD-10-CM

## 2025-08-18 PROCEDURE — 99285 EMERGENCY DEPT VISIT HI MDM: CPT

## 2025-08-19 DIAGNOSIS — N18.4 CHRONIC KIDNEY DISEASE, STAGE 4 (SEVERE): ICD-10-CM

## 2025-08-19 DIAGNOSIS — E11.65 TYPE 2 DIABETES MELLITUS WITH HYPERGLYCEMIA: ICD-10-CM

## 2025-08-19 DIAGNOSIS — I10 ESSENTIAL (PRIMARY) HYPERTENSION: ICD-10-CM

## 2025-08-19 DIAGNOSIS — Z29.9 ENCOUNTER FOR PROPHYLACTIC MEASURES, UNSPECIFIED: ICD-10-CM

## 2025-08-19 DIAGNOSIS — N40.0 BENIGN PROSTATIC HYPERPLASIA WITHOUT LOWER URINARY TRACT SYMPTOMS: ICD-10-CM

## 2025-08-19 DIAGNOSIS — R62.7 ADULT FAILURE TO THRIVE: ICD-10-CM

## 2025-08-19 DIAGNOSIS — Z86.2 PERSONAL HISTORY OF DISEASES OF THE BLOOD AND BLOOD-FORMING ORGANS AND CERTAIN DISORDERS INVOLVING THE IMMUNE MECHANISM: ICD-10-CM

## 2025-08-19 PROBLEM — M19.90 UNSPECIFIED OSTEOARTHRITIS, UNSPECIFIED SITE: Chronic | Status: ACTIVE | Noted: 2019-11-14

## 2025-08-19 LAB
ALBUMIN SERPL ELPH-MCNC: 5 G/DL — SIGNIFICANT CHANGE UP (ref 3.3–5)
ALP SERPL-CCNC: 97 U/L — SIGNIFICANT CHANGE UP (ref 40–120)
ALT FLD-CCNC: 15 U/L — SIGNIFICANT CHANGE UP (ref 10–45)
ANION GAP SERPL CALC-SCNC: 14 MMOL/L — SIGNIFICANT CHANGE UP (ref 5–17)
ANION GAP SERPL CALC-SCNC: 17 MMOL/L — SIGNIFICANT CHANGE UP (ref 5–17)
ANISOCYTOSIS BLD QL: SLIGHT — SIGNIFICANT CHANGE UP
APPEARANCE UR: CLEAR — SIGNIFICANT CHANGE UP
APTT BLD: 33.3 SEC — SIGNIFICANT CHANGE UP (ref 26.1–36.8)
AST SERPL-CCNC: 17 U/L — SIGNIFICANT CHANGE UP (ref 10–40)
B-OH-BUTYR SERPL-SCNC: 1.2 MMOL/L — HIGH
BASOPHILS # BLD AUTO: 0.04 K/UL — SIGNIFICANT CHANGE UP (ref 0–0.2)
BASOPHILS # BLD MANUAL: 0 K/UL — SIGNIFICANT CHANGE UP (ref 0–0.2)
BASOPHILS NFR BLD AUTO: 0.7 % — SIGNIFICANT CHANGE UP (ref 0–2)
BASOPHILS NFR BLD MANUAL: 0 % — SIGNIFICANT CHANGE UP (ref 0–2)
BILIRUB SERPL-MCNC: 0.8 MG/DL — SIGNIFICANT CHANGE UP (ref 0.2–1.2)
BILIRUB UR-MCNC: NEGATIVE — SIGNIFICANT CHANGE UP
BLD GP AB SCN SERPL QL: NEGATIVE — SIGNIFICANT CHANGE UP
BUN SERPL-MCNC: 44 MG/DL — HIGH (ref 7–23)
BUN SERPL-MCNC: 46 MG/DL — HIGH (ref 7–23)
CALCIUM SERPL-MCNC: 10.1 MG/DL — SIGNIFICANT CHANGE UP (ref 8.4–10.5)
CALCIUM SERPL-MCNC: 9.3 MG/DL — SIGNIFICANT CHANGE UP (ref 8.4–10.5)
CHLORIDE SERPL-SCNC: 104 MMOL/L — SIGNIFICANT CHANGE UP (ref 96–108)
CHLORIDE SERPL-SCNC: 99 MMOL/L — SIGNIFICANT CHANGE UP (ref 96–108)
CO2 SERPL-SCNC: 19 MMOL/L — LOW (ref 22–31)
CO2 SERPL-SCNC: 19 MMOL/L — LOW (ref 22–31)
COLOR SPEC: YELLOW — SIGNIFICANT CHANGE UP
CREAT SERPL-MCNC: 2.43 MG/DL — HIGH (ref 0.5–1.3)
CREAT SERPL-MCNC: 2.67 MG/DL — HIGH (ref 0.5–1.3)
DACRYOCYTES BLD QL SMEAR: SLIGHT — SIGNIFICANT CHANGE UP
DIFF PNL FLD: NEGATIVE — SIGNIFICANT CHANGE UP
EGFR: 24 ML/MIN/1.73M2 — LOW
EGFR: 24 ML/MIN/1.73M2 — LOW
EGFR: 27 ML/MIN/1.73M2 — LOW
EGFR: 27 ML/MIN/1.73M2 — LOW
EOSINOPHIL # BLD AUTO: 0.08 K/UL — SIGNIFICANT CHANGE UP (ref 0–0.5)
EOSINOPHIL # BLD MANUAL: 0 K/UL — SIGNIFICANT CHANGE UP (ref 0–0.5)
EOSINOPHIL NFR BLD AUTO: 1.4 % — SIGNIFICANT CHANGE UP (ref 0–6)
EOSINOPHIL NFR BLD MANUAL: 0 % — SIGNIFICANT CHANGE UP (ref 0–6)
GAS PNL BLDV: SIGNIFICANT CHANGE UP
GAS PNL BLDV: SIGNIFICANT CHANGE UP
GLUCOSE BLDC GLUCOMTR-MCNC: 216 MG/DL — HIGH (ref 70–99)
GLUCOSE BLDC GLUCOMTR-MCNC: 219 MG/DL — HIGH (ref 70–99)
GLUCOSE BLDC GLUCOMTR-MCNC: 274 MG/DL — HIGH (ref 70–99)
GLUCOSE BLDC GLUCOMTR-MCNC: 351 MG/DL — HIGH (ref 70–99)
GLUCOSE SERPL-MCNC: 326 MG/DL — HIGH (ref 70–99)
GLUCOSE SERPL-MCNC: 393 MG/DL — HIGH (ref 70–99)
GLUCOSE UR QL: >=1000 MG/DL
HCT VFR BLD CALC: 25.8 % — LOW (ref 39–50)
HGB BLD-MCNC: 9.1 G/DL — LOW (ref 13–17)
IMM GRANULOCYTES # BLD AUTO: 0.02 K/UL — SIGNIFICANT CHANGE UP (ref 0–0.07)
IMM GRANULOCYTES NFR BLD AUTO: 0.4 % — SIGNIFICANT CHANGE UP (ref 0–0.9)
INR BLD: 0.99 RATIO — SIGNIFICANT CHANGE UP (ref 0.85–1.16)
KETONES UR QL: NEGATIVE MG/DL — SIGNIFICANT CHANGE UP
LEUKOCYTE ESTERASE UR-ACNC: NEGATIVE — SIGNIFICANT CHANGE UP
LYMPHOCYTES # BLD AUTO: 1.48 K/UL — SIGNIFICANT CHANGE UP (ref 1–3.3)
LYMPHOCYTES # BLD MANUAL: 1.67 K/UL — SIGNIFICANT CHANGE UP (ref 1–3.3)
LYMPHOCYTES NFR BLD AUTO: 26.4 % — SIGNIFICANT CHANGE UP (ref 13–44)
LYMPHOCYTES NFR BLD MANUAL: 29.8 % — SIGNIFICANT CHANGE UP (ref 13–44)
MAGNESIUM SERPL-MCNC: 2.8 MG/DL — HIGH (ref 1.6–2.6)
MCHC RBC-ENTMCNC: 26.1 PG — LOW (ref 27–34)
MCHC RBC-ENTMCNC: 35.3 G/DL — SIGNIFICANT CHANGE UP (ref 32–36)
MCV RBC AUTO: 73.9 FL — LOW (ref 80–100)
MICROCYTES BLD QL: ABNORMAL
MONOCYTES # BLD AUTO: 0.29 K/UL — SIGNIFICANT CHANGE UP (ref 0–0.9)
MONOCYTES # BLD MANUAL: 0.2 K/UL — SIGNIFICANT CHANGE UP (ref 0–0.9)
MONOCYTES NFR BLD AUTO: 5.2 % — SIGNIFICANT CHANGE UP (ref 2–14)
MONOCYTES NFR BLD MANUAL: 3.5 % — SIGNIFICANT CHANGE UP (ref 2–14)
NEUTROPHILS # BLD AUTO: 3.7 K/UL — SIGNIFICANT CHANGE UP (ref 1.8–7.4)
NEUTROPHILS # BLD MANUAL: 3.74 K/UL — SIGNIFICANT CHANGE UP (ref 1.8–7.4)
NEUTROPHILS NFR BLD AUTO: 65.9 % — SIGNIFICANT CHANGE UP (ref 43–77)
NEUTROPHILS NFR BLD MANUAL: 66.7 % — SIGNIFICANT CHANGE UP (ref 43–77)
NITRITE UR-MCNC: NEGATIVE — SIGNIFICANT CHANGE UP
NRBC # BLD AUTO: 0 K/UL — SIGNIFICANT CHANGE UP (ref 0–0)
NRBC # FLD: 0 K/UL — SIGNIFICANT CHANGE UP (ref 0–0)
NRBC BLD AUTO-RTO: 0 /100 WBCS — SIGNIFICANT CHANGE UP (ref 0–0)
PH UR: 5.5 — SIGNIFICANT CHANGE UP (ref 5–8)
PHOSPHATE SERPL-MCNC: 4.4 MG/DL — SIGNIFICANT CHANGE UP (ref 2.5–4.5)
PLAT MORPH BLD: NORMAL — SIGNIFICANT CHANGE UP
PLATELET # BLD AUTO: 166 K/UL — SIGNIFICANT CHANGE UP (ref 150–400)
PMV BLD: 10.9 FL — SIGNIFICANT CHANGE UP (ref 7–13)
POIKILOCYTOSIS BLD QL AUTO: SLIGHT — SIGNIFICANT CHANGE UP
POLYCHROMASIA BLD QL SMEAR: SLIGHT — SIGNIFICANT CHANGE UP
POTASSIUM SERPL-MCNC: 4.1 MMOL/L — SIGNIFICANT CHANGE UP (ref 3.5–5.3)
POTASSIUM SERPL-MCNC: 4.2 MMOL/L — SIGNIFICANT CHANGE UP (ref 3.5–5.3)
POTASSIUM SERPL-SCNC: 4.1 MMOL/L — SIGNIFICANT CHANGE UP (ref 3.5–5.3)
POTASSIUM SERPL-SCNC: 4.2 MMOL/L — SIGNIFICANT CHANGE UP (ref 3.5–5.3)
PROT SERPL-MCNC: 7.7 G/DL — SIGNIFICANT CHANGE UP (ref 6–8.3)
PROT UR-MCNC: NEGATIVE MG/DL — SIGNIFICANT CHANGE UP
PROTHROM AB SERPL-ACNC: 11.5 SEC — SIGNIFICANT CHANGE UP (ref 9.9–13.4)
RBC # BLD: 3.49 M/UL — LOW (ref 4.2–5.8)
RBC # FLD: 17.3 % — HIGH (ref 10.3–14.5)
RBC BLD AUTO: ABNORMAL
RH IG SCN BLD-IMP: POSITIVE — SIGNIFICANT CHANGE UP
SODIUM SERPL-SCNC: 135 MMOL/L — SIGNIFICANT CHANGE UP (ref 135–145)
SODIUM SERPL-SCNC: 137 MMOL/L — SIGNIFICANT CHANGE UP (ref 135–145)
SP GR SPEC: 1.02 — SIGNIFICANT CHANGE UP (ref 1–1.03)
STOMATOCYTES BLD QL SMEAR: SLIGHT — SIGNIFICANT CHANGE UP
TARGETS BLD QL SMEAR: ABNORMAL
UROBILINOGEN FLD QL: 0.2 MG/DL — SIGNIFICANT CHANGE UP (ref 0.2–1)
WBC # BLD: 5.61 K/UL — SIGNIFICANT CHANGE UP (ref 3.8–10.5)
WBC # FLD AUTO: 5.61 K/UL — SIGNIFICANT CHANGE UP (ref 3.8–10.5)

## 2025-08-19 PROCEDURE — 83735 ASSAY OF MAGNESIUM: CPT

## 2025-08-19 PROCEDURE — 80053 COMPREHEN METABOLIC PANEL: CPT

## 2025-08-19 PROCEDURE — 82803 BLOOD GASES ANY COMBINATION: CPT

## 2025-08-19 PROCEDURE — 86901 BLOOD TYPING SEROLOGIC RH(D): CPT

## 2025-08-19 PROCEDURE — 80048 BASIC METABOLIC PNL TOTAL CA: CPT

## 2025-08-19 PROCEDURE — 85018 HEMOGLOBIN: CPT

## 2025-08-19 PROCEDURE — 99223 1ST HOSP IP/OBS HIGH 75: CPT

## 2025-08-19 PROCEDURE — 85610 PROTHROMBIN TIME: CPT

## 2025-08-19 PROCEDURE — 82962 GLUCOSE BLOOD TEST: CPT

## 2025-08-19 PROCEDURE — 84295 ASSAY OF SERUM SODIUM: CPT

## 2025-08-19 PROCEDURE — 97161 PT EVAL LOW COMPLEX 20 MIN: CPT

## 2025-08-19 PROCEDURE — 84100 ASSAY OF PHOSPHORUS: CPT

## 2025-08-19 PROCEDURE — 71045 X-RAY EXAM CHEST 1 VIEW: CPT | Mod: 26

## 2025-08-19 PROCEDURE — 81003 URINALYSIS AUTO W/O SCOPE: CPT

## 2025-08-19 PROCEDURE — 84132 ASSAY OF SERUM POTASSIUM: CPT

## 2025-08-19 PROCEDURE — 85730 THROMBOPLASTIN TIME PARTIAL: CPT

## 2025-08-19 PROCEDURE — 86900 BLOOD TYPING SEROLOGIC ABO: CPT

## 2025-08-19 PROCEDURE — 82947 ASSAY GLUCOSE BLOOD QUANT: CPT

## 2025-08-19 PROCEDURE — 83605 ASSAY OF LACTIC ACID: CPT

## 2025-08-19 PROCEDURE — 86850 RBC ANTIBODY SCREEN: CPT

## 2025-08-19 PROCEDURE — 36415 COLL VENOUS BLD VENIPUNCTURE: CPT

## 2025-08-19 PROCEDURE — 71045 X-RAY EXAM CHEST 1 VIEW: CPT

## 2025-08-19 PROCEDURE — 82435 ASSAY OF BLOOD CHLORIDE: CPT

## 2025-08-19 PROCEDURE — 82330 ASSAY OF CALCIUM: CPT

## 2025-08-19 PROCEDURE — 93010 ELECTROCARDIOGRAM REPORT: CPT

## 2025-08-19 PROCEDURE — 82010 KETONE BODYS QUAN: CPT

## 2025-08-19 PROCEDURE — 85014 HEMATOCRIT: CPT

## 2025-08-19 PROCEDURE — 85025 COMPLETE CBC W/AUTO DIFF WBC: CPT

## 2025-08-19 RX ORDER — DEXTROSE 50 % IN WATER 50 %
25 SYRINGE (ML) INTRAVENOUS ONCE
Refills: 0 | Status: DISCONTINUED | OUTPATIENT
Start: 2025-08-19 | End: 2025-08-21

## 2025-08-19 RX ORDER — DEXTROSE 50 % IN WATER 50 %
15 SYRINGE (ML) INTRAVENOUS ONCE
Refills: 0 | Status: DISCONTINUED | OUTPATIENT
Start: 2025-08-19 | End: 2025-08-21

## 2025-08-19 RX ORDER — SODIUM CHLORIDE 9 G/1000ML
1000 INJECTION, SOLUTION INTRAVENOUS ONCE
Refills: 0 | Status: COMPLETED | OUTPATIENT
Start: 2025-08-19 | End: 2025-08-19

## 2025-08-19 RX ORDER — ASPIRIN 325 MG
1 TABLET ORAL
Refills: 0 | DISCHARGE

## 2025-08-19 RX ORDER — FOLIC ACID 1 MG/1
1 TABLET ORAL DAILY
Refills: 0 | Status: DISCONTINUED | OUTPATIENT
Start: 2025-08-19 | End: 2025-08-21

## 2025-08-19 RX ORDER — INSULIN LISPRO 100 U/ML
6 INJECTION, SOLUTION INTRAVENOUS; SUBCUTANEOUS ONCE
Refills: 0 | Status: COMPLETED | OUTPATIENT
Start: 2025-08-19 | End: 2025-08-19

## 2025-08-19 RX ORDER — PIOGLITAZONE HYDROCHLORIDE 15 MG/1
1 TABLET ORAL
Refills: 0 | DISCHARGE

## 2025-08-19 RX ORDER — ACETAMINOPHEN 500 MG/5ML
650 LIQUID (ML) ORAL EVERY 6 HOURS
Refills: 0 | Status: DISCONTINUED | OUTPATIENT
Start: 2025-08-19 | End: 2025-08-21

## 2025-08-19 RX ORDER — GLUCAGON 3 MG/1
1 POWDER NASAL ONCE
Refills: 0 | Status: DISCONTINUED | OUTPATIENT
Start: 2025-08-19 | End: 2025-08-21

## 2025-08-19 RX ORDER — MELATONIN 5 MG
3 TABLET ORAL AT BEDTIME
Refills: 0 | Status: DISCONTINUED | OUTPATIENT
Start: 2025-08-19 | End: 2025-08-21

## 2025-08-19 RX ORDER — INSULIN GLARGINE-YFGN 100 [IU]/ML
5 INJECTION, SOLUTION SUBCUTANEOUS AT BEDTIME
Refills: 0 | Status: DISCONTINUED | OUTPATIENT
Start: 2025-08-19 | End: 2025-08-20

## 2025-08-19 RX ORDER — LISINOPRIL 5 MG/1
10 TABLET ORAL DAILY
Refills: 0 | Status: DISCONTINUED | OUTPATIENT
Start: 2025-08-19 | End: 2025-08-21

## 2025-08-19 RX ORDER — ASPIRIN 325 MG
81 TABLET ORAL DAILY
Refills: 0 | Status: DISCONTINUED | OUTPATIENT
Start: 2025-08-19 | End: 2025-08-21

## 2025-08-19 RX ORDER — FOLIC ACID 1 MG/1
1 TABLET ORAL
Refills: 0 | DISCHARGE

## 2025-08-19 RX ORDER — INSULIN LISPRO 100 U/ML
INJECTION, SOLUTION INTRAVENOUS; SUBCUTANEOUS
Refills: 0 | Status: DISCONTINUED | OUTPATIENT
Start: 2025-08-19 | End: 2025-08-20

## 2025-08-19 RX ORDER — SODIUM CHLORIDE 9 G/1000ML
1000 INJECTION, SOLUTION INTRAVENOUS
Refills: 0 | Status: DISCONTINUED | OUTPATIENT
Start: 2025-08-19 | End: 2025-08-21

## 2025-08-19 RX ORDER — HEPARIN SODIUM 1000 [USP'U]/ML
5000 INJECTION INTRAVENOUS; SUBCUTANEOUS EVERY 12 HOURS
Refills: 0 | Status: DISCONTINUED | OUTPATIENT
Start: 2025-08-19 | End: 2025-08-21

## 2025-08-19 RX ORDER — AMLODIPINE BESYLATE 10 MG/1
5 TABLET ORAL DAILY
Refills: 0 | Status: DISCONTINUED | OUTPATIENT
Start: 2025-08-19 | End: 2025-08-21

## 2025-08-19 RX ORDER — INSULIN LISPRO 100 U/ML
INJECTION, SOLUTION INTRAVENOUS; SUBCUTANEOUS AT BEDTIME
Refills: 0 | Status: DISCONTINUED | OUTPATIENT
Start: 2025-08-19 | End: 2025-08-20

## 2025-08-19 RX ADMIN — Medication 81 MILLIGRAM(S): at 12:34

## 2025-08-19 RX ADMIN — AMLODIPINE BESYLATE 5 MILLIGRAM(S): 10 TABLET ORAL at 12:34

## 2025-08-19 RX ADMIN — HEPARIN SODIUM 5000 UNIT(S): 1000 INJECTION INTRAVENOUS; SUBCUTANEOUS at 17:34

## 2025-08-19 RX ADMIN — INSULIN LISPRO 3: 100 INJECTION, SOLUTION INTRAVENOUS; SUBCUTANEOUS at 22:09

## 2025-08-19 RX ADMIN — INSULIN LISPRO 4: 100 INJECTION, SOLUTION INTRAVENOUS; SUBCUTANEOUS at 12:34

## 2025-08-19 RX ADMIN — SODIUM CHLORIDE 1000 MILLILITER(S): 9 INJECTION, SOLUTION INTRAVENOUS at 01:45

## 2025-08-19 RX ADMIN — FOLIC ACID 1 MILLIGRAM(S): 1 TABLET ORAL at 12:34

## 2025-08-19 RX ADMIN — INSULIN LISPRO 6 UNIT(S): 100 INJECTION, SOLUTION INTRAVENOUS; SUBCUTANEOUS at 04:00

## 2025-08-19 RX ADMIN — INSULIN LISPRO 6: 100 INJECTION, SOLUTION INTRAVENOUS; SUBCUTANEOUS at 17:34

## 2025-08-19 RX ADMIN — INSULIN GLARGINE-YFGN 5 UNIT(S): 100 INJECTION, SOLUTION SUBCUTANEOUS at 22:09

## 2025-08-19 RX ADMIN — LISINOPRIL 10 MILLIGRAM(S): 5 TABLET ORAL at 12:35

## 2025-08-20 DIAGNOSIS — E78.5 HYPERLIPIDEMIA, UNSPECIFIED: ICD-10-CM

## 2025-08-20 LAB
A1C WITH ESTIMATED AVERAGE GLUCOSE RESULT: 7.3 % — HIGH (ref 4–5.6)
ANION GAP SERPL CALC-SCNC: 12 MMOL/L — SIGNIFICANT CHANGE UP (ref 5–17)
BUN SERPL-MCNC: 40 MG/DL — HIGH (ref 7–23)
CALCIUM SERPL-MCNC: 9.3 MG/DL — SIGNIFICANT CHANGE UP (ref 8.4–10.5)
CHLORIDE SERPL-SCNC: 109 MMOL/L — HIGH (ref 96–108)
CHOLEST SERPL-MCNC: 96 MG/DL — SIGNIFICANT CHANGE UP
CO2 SERPL-SCNC: 20 MMOL/L — LOW (ref 22–31)
CREAT SERPL-MCNC: 2.23 MG/DL — HIGH (ref 0.5–1.3)
EGFR: 30 ML/MIN/1.73M2 — LOW
EGFR: 30 ML/MIN/1.73M2 — LOW
ESTIMATED AVERAGE GLUCOSE: 163 MG/DL — HIGH (ref 68–114)
FERRITIN SERPL-MCNC: 894 NG/ML — HIGH (ref 30–400)
GLUCOSE BLDC GLUCOMTR-MCNC: 235 MG/DL — HIGH (ref 70–99)
GLUCOSE BLDC GLUCOMTR-MCNC: 306 MG/DL — HIGH (ref 70–99)
GLUCOSE BLDC GLUCOMTR-MCNC: 355 MG/DL — HIGH (ref 70–99)
GLUCOSE BLDC GLUCOMTR-MCNC: 366 MG/DL — HIGH (ref 70–99)
GLUCOSE SERPL-MCNC: 225 MG/DL — HIGH (ref 70–99)
HCT VFR BLD CALC: 21.5 % — LOW (ref 39–50)
HCT VFR BLD CALC: 22.6 % — LOW (ref 39–50)
HDLC SERPL-MCNC: 26 MG/DL — LOW
HGB BLD-MCNC: 7.4 G/DL — LOW (ref 13–17)
HGB BLD-MCNC: 7.9 G/DL — LOW (ref 13–17)
IRON SATN MFR SERPL: 24 % — SIGNIFICANT CHANGE UP (ref 16–55)
IRON SATN MFR SERPL: 49 UG/DL — SIGNIFICANT CHANGE UP (ref 45–165)
LDLC SERPL-MCNC: 45 MG/DL — SIGNIFICANT CHANGE UP
LIPID PNL WITH DIRECT LDL SERPL: 45 MG/DL — SIGNIFICANT CHANGE UP
MCHC RBC-ENTMCNC: 25.9 PG — LOW (ref 27–34)
MCHC RBC-ENTMCNC: 26.3 PG — LOW (ref 27–34)
MCHC RBC-ENTMCNC: 34.4 G/DL — SIGNIFICANT CHANGE UP (ref 32–36)
MCHC RBC-ENTMCNC: 35 G/DL — SIGNIFICANT CHANGE UP (ref 32–36)
MCV RBC AUTO: 75.2 FL — LOW (ref 80–100)
MCV RBC AUTO: 75.3 FL — LOW (ref 80–100)
NONHDLC SERPL-MCNC: 70 MG/DL — SIGNIFICANT CHANGE UP
NRBC # BLD AUTO: 0 K/UL — SIGNIFICANT CHANGE UP (ref 0–0)
NRBC # BLD AUTO: 0 K/UL — SIGNIFICANT CHANGE UP (ref 0–0)
NRBC # FLD: 0 K/UL — SIGNIFICANT CHANGE UP (ref 0–0)
NRBC # FLD: 0 K/UL — SIGNIFICANT CHANGE UP (ref 0–0)
NRBC BLD AUTO-RTO: 0 /100 WBCS — SIGNIFICANT CHANGE UP (ref 0–0)
NRBC BLD AUTO-RTO: 0 /100 WBCS — SIGNIFICANT CHANGE UP (ref 0–0)
PLATELET # BLD AUTO: 122 K/UL — LOW (ref 150–400)
PLATELET # BLD AUTO: 126 K/UL — LOW (ref 150–400)
PMV BLD: 10.4 FL — SIGNIFICANT CHANGE UP (ref 7–13)
PMV BLD: 10.6 FL — SIGNIFICANT CHANGE UP (ref 7–13)
POTASSIUM SERPL-MCNC: 4.8 MMOL/L — SIGNIFICANT CHANGE UP (ref 3.5–5.3)
POTASSIUM SERPL-SCNC: 4.8 MMOL/L — SIGNIFICANT CHANGE UP (ref 3.5–5.3)
RBC # BLD: 2.86 M/UL — LOW (ref 4.2–5.8)
RBC # BLD: 3 M/UL — LOW (ref 4.2–5.8)
RBC # FLD: 17.1 % — HIGH (ref 10.3–14.5)
RBC # FLD: 17.2 % — HIGH (ref 10.3–14.5)
SODIUM SERPL-SCNC: 141 MMOL/L — SIGNIFICANT CHANGE UP (ref 135–145)
TIBC SERPL-MCNC: 207 UG/DL — LOW (ref 220–430)
TRIGL SERPL-MCNC: 143 MG/DL — SIGNIFICANT CHANGE UP
TSH SERPL-MCNC: 1.41 UIU/ML — SIGNIFICANT CHANGE UP (ref 0.27–4.2)
UIBC SERPL-MCNC: 158 UG/DL — SIGNIFICANT CHANGE UP (ref 110–370)
WBC # BLD: 3.22 K/UL — LOW (ref 3.8–10.5)
WBC # BLD: 3.49 K/UL — LOW (ref 3.8–10.5)
WBC # FLD AUTO: 3.22 K/UL — LOW (ref 3.8–10.5)
WBC # FLD AUTO: 3.49 K/UL — LOW (ref 3.8–10.5)

## 2025-08-20 PROCEDURE — 82803 BLOOD GASES ANY COMBINATION: CPT

## 2025-08-20 PROCEDURE — 99222 1ST HOSP IP/OBS MODERATE 55: CPT | Mod: GC

## 2025-08-20 PROCEDURE — 85014 HEMATOCRIT: CPT

## 2025-08-20 PROCEDURE — 83605 ASSAY OF LACTIC ACID: CPT

## 2025-08-20 PROCEDURE — 82962 GLUCOSE BLOOD TEST: CPT

## 2025-08-20 PROCEDURE — 84100 ASSAY OF PHOSPHORUS: CPT

## 2025-08-20 PROCEDURE — 87086 URINE CULTURE/COLONY COUNT: CPT

## 2025-08-20 PROCEDURE — 82330 ASSAY OF CALCIUM: CPT

## 2025-08-20 PROCEDURE — 99233 SBSQ HOSP IP/OBS HIGH 50: CPT

## 2025-08-20 PROCEDURE — 83735 ASSAY OF MAGNESIUM: CPT

## 2025-08-20 PROCEDURE — 85027 COMPLETE CBC AUTOMATED: CPT

## 2025-08-20 PROCEDURE — 84443 ASSAY THYROID STIM HORMONE: CPT

## 2025-08-20 PROCEDURE — 86850 RBC ANTIBODY SCREEN: CPT

## 2025-08-20 PROCEDURE — 97161 PT EVAL LOW COMPLEX 20 MIN: CPT

## 2025-08-20 PROCEDURE — 80048 BASIC METABOLIC PNL TOTAL CA: CPT

## 2025-08-20 PROCEDURE — 84295 ASSAY OF SERUM SODIUM: CPT

## 2025-08-20 PROCEDURE — 83036 HEMOGLOBIN GLYCOSYLATED A1C: CPT

## 2025-08-20 PROCEDURE — 86901 BLOOD TYPING SEROLOGIC RH(D): CPT

## 2025-08-20 PROCEDURE — 85025 COMPLETE CBC W/AUTO DIFF WBC: CPT

## 2025-08-20 PROCEDURE — 81003 URINALYSIS AUTO W/O SCOPE: CPT

## 2025-08-20 PROCEDURE — 82010 KETONE BODYS QUAN: CPT

## 2025-08-20 PROCEDURE — 86900 BLOOD TYPING SEROLOGIC ABO: CPT

## 2025-08-20 PROCEDURE — 85018 HEMOGLOBIN: CPT

## 2025-08-20 PROCEDURE — 83540 ASSAY OF IRON: CPT

## 2025-08-20 PROCEDURE — 82435 ASSAY OF BLOOD CHLORIDE: CPT

## 2025-08-20 PROCEDURE — 84132 ASSAY OF SERUM POTASSIUM: CPT

## 2025-08-20 PROCEDURE — 82728 ASSAY OF FERRITIN: CPT

## 2025-08-20 PROCEDURE — 71045 X-RAY EXAM CHEST 1 VIEW: CPT

## 2025-08-20 PROCEDURE — 80061 LIPID PANEL: CPT

## 2025-08-20 PROCEDURE — 80053 COMPREHEN METABOLIC PANEL: CPT

## 2025-08-20 PROCEDURE — 85730 THROMBOPLASTIN TIME PARTIAL: CPT

## 2025-08-20 PROCEDURE — 85610 PROTHROMBIN TIME: CPT

## 2025-08-20 PROCEDURE — 36415 COLL VENOUS BLD VENIPUNCTURE: CPT

## 2025-08-20 PROCEDURE — 83550 IRON BINDING TEST: CPT

## 2025-08-20 PROCEDURE — 82947 ASSAY GLUCOSE BLOOD QUANT: CPT

## 2025-08-20 RX ORDER — INSULIN LISPRO 100 U/ML
5 INJECTION, SOLUTION INTRAVENOUS; SUBCUTANEOUS
Refills: 0 | Status: DISCONTINUED | OUTPATIENT
Start: 2025-08-20 | End: 2025-08-21

## 2025-08-20 RX ORDER — INSULIN GLARGINE-YFGN 100 [IU]/ML
12 INJECTION, SOLUTION SUBCUTANEOUS AT BEDTIME
Refills: 0 | Status: DISCONTINUED | OUTPATIENT
Start: 2025-08-20 | End: 2025-08-21

## 2025-08-20 RX ORDER — INSULIN LISPRO 100 U/ML
INJECTION, SOLUTION INTRAVENOUS; SUBCUTANEOUS
Refills: 0 | Status: DISCONTINUED | OUTPATIENT
Start: 2025-08-20 | End: 2025-08-21

## 2025-08-20 RX ORDER — INSULIN LISPRO 100 U/ML
INJECTION, SOLUTION INTRAVENOUS; SUBCUTANEOUS AT BEDTIME
Refills: 0 | Status: DISCONTINUED | OUTPATIENT
Start: 2025-08-20 | End: 2025-08-21

## 2025-08-20 RX ADMIN — HEPARIN SODIUM 5000 UNIT(S): 1000 INJECTION INTRAVENOUS; SUBCUTANEOUS at 05:08

## 2025-08-20 RX ADMIN — INSULIN LISPRO 5 UNIT(S): 100 INJECTION, SOLUTION INTRAVENOUS; SUBCUTANEOUS at 18:04

## 2025-08-20 RX ADMIN — INSULIN LISPRO 4: 100 INJECTION, SOLUTION INTRAVENOUS; SUBCUTANEOUS at 18:05

## 2025-08-20 RX ADMIN — INSULIN LISPRO 3: 100 INJECTION, SOLUTION INTRAVENOUS; SUBCUTANEOUS at 22:16

## 2025-08-20 RX ADMIN — INSULIN LISPRO 4: 100 INJECTION, SOLUTION INTRAVENOUS; SUBCUTANEOUS at 09:01

## 2025-08-20 RX ADMIN — INSULIN GLARGINE-YFGN 12 UNIT(S): 100 INJECTION, SOLUTION SUBCUTANEOUS at 22:16

## 2025-08-20 RX ADMIN — INSULIN LISPRO 10: 100 INJECTION, SOLUTION INTRAVENOUS; SUBCUTANEOUS at 13:21

## 2025-08-20 RX ADMIN — HEPARIN SODIUM 5000 UNIT(S): 1000 INJECTION INTRAVENOUS; SUBCUTANEOUS at 17:16

## 2025-08-20 RX ADMIN — Medication 81 MILLIGRAM(S): at 11:56

## 2025-08-20 RX ADMIN — FOLIC ACID 1 MILLIGRAM(S): 1 TABLET ORAL at 11:56

## 2025-08-21 ENCOUNTER — TRANSCRIPTION ENCOUNTER (OUTPATIENT)
Age: 80
End: 2025-08-21

## 2025-08-21 VITALS
TEMPERATURE: 98 F | HEART RATE: 75 BPM | DIASTOLIC BLOOD PRESSURE: 78 MMHG | RESPIRATION RATE: 18 BRPM | OXYGEN SATURATION: 100 % | SYSTOLIC BLOOD PRESSURE: 131 MMHG

## 2025-08-21 LAB
CULTURE RESULTS: SIGNIFICANT CHANGE UP
GLUCOSE BLDC GLUCOMTR-MCNC: 238 MG/DL — HIGH (ref 70–99)
GLUCOSE BLDC GLUCOMTR-MCNC: 245 MG/DL — HIGH (ref 70–99)
SPECIMEN SOURCE: SIGNIFICANT CHANGE UP

## 2025-08-21 PROCEDURE — 85730 THROMBOPLASTIN TIME PARTIAL: CPT

## 2025-08-21 PROCEDURE — 84295 ASSAY OF SERUM SODIUM: CPT

## 2025-08-21 PROCEDURE — 97161 PT EVAL LOW COMPLEX 20 MIN: CPT

## 2025-08-21 PROCEDURE — 36415 COLL VENOUS BLD VENIPUNCTURE: CPT

## 2025-08-21 PROCEDURE — 83605 ASSAY OF LACTIC ACID: CPT

## 2025-08-21 PROCEDURE — 82728 ASSAY OF FERRITIN: CPT

## 2025-08-21 PROCEDURE — 99239 HOSP IP/OBS DSCHRG MGMT >30: CPT

## 2025-08-21 PROCEDURE — 83735 ASSAY OF MAGNESIUM: CPT

## 2025-08-21 PROCEDURE — 86901 BLOOD TYPING SEROLOGIC RH(D): CPT

## 2025-08-21 PROCEDURE — 99232 SBSQ HOSP IP/OBS MODERATE 35: CPT

## 2025-08-21 PROCEDURE — 84443 ASSAY THYROID STIM HORMONE: CPT

## 2025-08-21 PROCEDURE — 82010 KETONE BODYS QUAN: CPT

## 2025-08-21 PROCEDURE — 71045 X-RAY EXAM CHEST 1 VIEW: CPT

## 2025-08-21 PROCEDURE — 84132 ASSAY OF SERUM POTASSIUM: CPT

## 2025-08-21 PROCEDURE — 85027 COMPLETE CBC AUTOMATED: CPT

## 2025-08-21 PROCEDURE — 83540 ASSAY OF IRON: CPT

## 2025-08-21 PROCEDURE — 96374 THER/PROPH/DIAG INJ IV PUSH: CPT

## 2025-08-21 PROCEDURE — 82947 ASSAY GLUCOSE BLOOD QUANT: CPT

## 2025-08-21 PROCEDURE — 82803 BLOOD GASES ANY COMBINATION: CPT

## 2025-08-21 PROCEDURE — 80061 LIPID PANEL: CPT

## 2025-08-21 PROCEDURE — 99285 EMERGENCY DEPT VISIT HI MDM: CPT

## 2025-08-21 PROCEDURE — 86900 BLOOD TYPING SEROLOGIC ABO: CPT

## 2025-08-21 PROCEDURE — 83550 IRON BINDING TEST: CPT

## 2025-08-21 PROCEDURE — 86850 RBC ANTIBODY SCREEN: CPT

## 2025-08-21 PROCEDURE — 87086 URINE CULTURE/COLONY COUNT: CPT

## 2025-08-21 PROCEDURE — 82435 ASSAY OF BLOOD CHLORIDE: CPT

## 2025-08-21 PROCEDURE — 85014 HEMATOCRIT: CPT

## 2025-08-21 PROCEDURE — 85018 HEMOGLOBIN: CPT

## 2025-08-21 PROCEDURE — 85025 COMPLETE CBC W/AUTO DIFF WBC: CPT

## 2025-08-21 PROCEDURE — 85610 PROTHROMBIN TIME: CPT

## 2025-08-21 PROCEDURE — 81003 URINALYSIS AUTO W/O SCOPE: CPT

## 2025-08-21 PROCEDURE — 84100 ASSAY OF PHOSPHORUS: CPT

## 2025-08-21 PROCEDURE — 80048 BASIC METABOLIC PNL TOTAL CA: CPT

## 2025-08-21 PROCEDURE — 93005 ELECTROCARDIOGRAM TRACING: CPT

## 2025-08-21 PROCEDURE — 80053 COMPREHEN METABOLIC PANEL: CPT

## 2025-08-21 PROCEDURE — 82962 GLUCOSE BLOOD TEST: CPT

## 2025-08-21 PROCEDURE — 83036 HEMOGLOBIN GLYCOSYLATED A1C: CPT

## 2025-08-21 PROCEDURE — 82330 ASSAY OF CALCIUM: CPT

## 2025-08-21 RX ORDER — LISINOPRIL 5 MG/1
1 TABLET ORAL
Refills: 0 | DISCHARGE

## 2025-08-21 RX ORDER — AMLODIPINE BESYLATE 10 MG/1
1 TABLET ORAL
Qty: 30 | Refills: 0
Start: 2025-08-21 | End: 2025-09-19

## 2025-08-21 RX ORDER — SEMAGLUTIDE 1 MG/.5ML
0.25 INJECTION, SOLUTION SUBCUTANEOUS
Refills: 0 | DISCHARGE

## 2025-08-21 RX ORDER — EMPAGLIFLOZIN 25 MG/1
1 TABLET, FILM COATED ORAL
Qty: 30 | Refills: 0
Start: 2025-08-21 | End: 2025-09-19

## 2025-08-21 RX ORDER — CHLORTHALIDONE 25 MG/1
1 TABLET ORAL
Refills: 0 | DISCHARGE

## 2025-08-21 RX ORDER — SEMAGLUTIDE 1 MG/.5ML
0.5 INJECTION, SOLUTION SUBCUTANEOUS
Qty: 4 | Refills: 0
Start: 2025-08-21 | End: 2025-09-19

## 2025-08-21 RX ORDER — AMLODIPINE BESYLATE 10 MG/1
1 TABLET ORAL
Refills: 0 | DISCHARGE

## 2025-08-21 RX ORDER — EMPAGLIFLOZIN 25 MG/1
1 TABLET, FILM COATED ORAL
Refills: 0 | DISCHARGE

## 2025-08-21 RX ORDER — LISINOPRIL 5 MG/1
1 TABLET ORAL
Qty: 30 | Refills: 0
Start: 2025-08-21 | End: 2025-09-19

## 2025-08-21 RX ADMIN — Medication 81 MILLIGRAM(S): at 11:09

## 2025-08-21 RX ADMIN — INSULIN LISPRO 2: 100 INJECTION, SOLUTION INTRAVENOUS; SUBCUTANEOUS at 13:06

## 2025-08-21 RX ADMIN — INSULIN LISPRO 2: 100 INJECTION, SOLUTION INTRAVENOUS; SUBCUTANEOUS at 08:53

## 2025-08-21 RX ADMIN — FOLIC ACID 1 MILLIGRAM(S): 1 TABLET ORAL at 11:10

## 2025-08-21 RX ADMIN — INSULIN LISPRO 5 UNIT(S): 100 INJECTION, SOLUTION INTRAVENOUS; SUBCUTANEOUS at 08:54

## 2025-08-21 RX ADMIN — INSULIN LISPRO 5 UNIT(S): 100 INJECTION, SOLUTION INTRAVENOUS; SUBCUTANEOUS at 12:54

## 2025-08-21 RX ADMIN — HEPARIN SODIUM 5000 UNIT(S): 1000 INJECTION INTRAVENOUS; SUBCUTANEOUS at 05:41
